# Patient Record
Sex: FEMALE | Race: WHITE | NOT HISPANIC OR LATINO | Employment: OTHER | URBAN - METROPOLITAN AREA
[De-identification: names, ages, dates, MRNs, and addresses within clinical notes are randomized per-mention and may not be internally consistent; named-entity substitution may affect disease eponyms.]

---

## 2017-02-08 ENCOUNTER — RECORDS - HEALTHEAST (OUTPATIENT)
Dept: LAB | Facility: CLINIC | Age: 62
End: 2017-02-08

## 2017-02-08 LAB
CHOLEST SERPL-MCNC: 259 MG/DL
FASTING STATUS PATIENT QL REPORTED: ABNORMAL
HDLC SERPL-MCNC: 55 MG/DL
LDLC SERPL CALC-MCNC: 179 MG/DL
TRIGL SERPL-MCNC: 126 MG/DL

## 2019-03-26 ENCOUNTER — RECORDS - HEALTHEAST (OUTPATIENT)
Dept: LAB | Facility: CLINIC | Age: 64
End: 2019-03-26

## 2019-03-26 LAB
CHOLEST SERPL-MCNC: 315 MG/DL
FASTING STATUS PATIENT QL REPORTED: ABNORMAL
HDLC SERPL-MCNC: 56 MG/DL
LDLC SERPL CALC-MCNC: 231 MG/DL
TRIGL SERPL-MCNC: 140 MG/DL

## 2019-03-27 LAB — 25(OH)D3 SERPL-MCNC: 45.8 NG/ML (ref 30–80)

## 2019-04-25 ENCOUNTER — RECORDS - HEALTHEAST (OUTPATIENT)
Dept: LAB | Facility: CLINIC | Age: 64
End: 2019-04-25

## 2019-04-25 LAB — AST SERPL W P-5'-P-CCNC: 30 U/L (ref 0–40)

## 2019-07-24 ENCOUNTER — RECORDS - HEALTHEAST (OUTPATIENT)
Dept: LAB | Facility: CLINIC | Age: 64
End: 2019-07-24

## 2019-07-24 LAB
AST SERPL W P-5'-P-CCNC: 32 U/L (ref 0–40)
CHOLEST SERPL-MCNC: 293 MG/DL
FASTING STATUS PATIENT QL REPORTED: YES
HDLC SERPL-MCNC: 55 MG/DL
LDLC SERPL CALC-MCNC: 203 MG/DL
TRIGL SERPL-MCNC: 176 MG/DL

## 2019-11-11 ENCOUNTER — RECORDS - HEALTHEAST (OUTPATIENT)
Dept: LAB | Facility: CLINIC | Age: 64
End: 2019-11-11

## 2019-11-11 LAB
CHOLEST SERPL-MCNC: 156 MG/DL
FASTING STATUS PATIENT QL REPORTED: NORMAL
HDLC SERPL-MCNC: 58 MG/DL
LDLC SERPL CALC-MCNC: 74 MG/DL
TRIGL SERPL-MCNC: 122 MG/DL

## 2020-10-27 PROBLEM — M21.379 FOOT DROP: Status: ACTIVE | Noted: 2020-10-27

## 2020-10-27 PROBLEM — G11.2: Status: ACTIVE | Noted: 2020-10-27

## 2020-10-27 PROBLEM — G35 RELAPSING REMITTING MULTIPLE SCLEROSIS (H): Status: ACTIVE | Noted: 2020-10-27

## 2020-10-27 SDOH — HEALTH STABILITY: MENTAL HEALTH: HOW OFTEN DO YOU HAVE 6 OR MORE DRINKS ON ONE OCCASION?: NOT ASKED

## 2020-10-27 SDOH — HEALTH STABILITY: MENTAL HEALTH: HOW MANY STANDARD DRINKS CONTAINING ALCOHOL DO YOU HAVE ON A TYPICAL DAY?: NOT ASKED

## 2020-10-27 SDOH — HEALTH STABILITY: MENTAL HEALTH: HOW OFTEN DO YOU HAVE A DRINK CONTAINING ALCOHOL?: 2-3 TIMES A WEEK

## 2020-10-28 ENCOUNTER — VIRTUAL VISIT (OUTPATIENT)
Dept: NEUROLOGY | Facility: CLINIC | Age: 65
End: 2020-10-28
Payer: MEDICARE

## 2020-10-28 VITALS — BODY MASS INDEX: 27.42 KG/M2 | WEIGHT: 149 LBS | HEIGHT: 62 IN

## 2020-10-28 DIAGNOSIS — G35 RELAPSING REMITTING MULTIPLE SCLEROSIS (H): Primary | ICD-10-CM

## 2020-10-28 DIAGNOSIS — M21.371 RIGHT FOOT DROP: ICD-10-CM

## 2020-10-28 PROCEDURE — 99215 OFFICE O/P EST HI 40 MIN: CPT | Mod: 95 | Performed by: PSYCHIATRY & NEUROLOGY

## 2020-10-28 RX ORDER — BACLOFEN 20 MG/1
20 TABLET ORAL 2 TIMES DAILY
COMMUNITY
Start: 2020-05-06 | End: 2020-10-28

## 2020-10-28 RX ORDER — SERTRALINE HYDROCHLORIDE 100 MG/1
TABLET, FILM COATED ORAL
COMMUNITY
Start: 2019-06-24 | End: 2020-10-28

## 2020-10-28 RX ORDER — CHOLECALCIFEROL (VITAMIN D3) 50 MCG
1 TABLET ORAL DAILY
COMMUNITY
End: 2023-08-29

## 2020-10-28 RX ORDER — SERTRALINE HYDROCHLORIDE 100 MG/1
100 TABLET, FILM COATED ORAL DAILY
Qty: 90 TABLET | Refills: 3 | Status: SHIPPED | OUTPATIENT
Start: 2020-10-28 | End: 2021-05-18

## 2020-10-28 RX ORDER — BACLOFEN 20 MG/1
20 TABLET ORAL 2 TIMES DAILY
Qty: 180 TABLET | Refills: 3 | Status: SHIPPED | OUTPATIENT
Start: 2020-10-28 | End: 2021-05-18

## 2020-10-28 RX ORDER — ALENDRONATE SODIUM 70 MG/1
TABLET ORAL
COMMUNITY
End: 2023-08-29

## 2020-10-28 SDOH — HEALTH STABILITY: MENTAL HEALTH: HOW OFTEN DO YOU HAVE A DRINK CONTAINING ALCOHOL?: NOT ASKED

## 2020-10-28 ASSESSMENT — MIFFLIN-ST. JEOR: SCORE: 1161.17

## 2020-10-28 NOTE — LETTER
"    10/28/2020         RE: Ruthie Aaron  [address] [place] [address]        Dear Colleague,    Thank you for referring your patient, Ruthie Aaron, to the [facility] [place]. Please see a copy of my visit note below.    Video follow-up visit requested by patient  Video follow-up visit due to the COVID-19 pandemic  Text number [phone]  Patient identified    .Patient is being evaluated via a billable video visit. The patient has been notified of following:     \"This video visit will be conducted via a call between you and your physician/provider. We have found that certain health care needs can be provided without the need for an in-person physical exam. This service lets us provide the care you need with a video conversation. If a prescription is necessary we can send it directly to your pharmacy. If lab work is needed we can place an order for that and you can then stop by our lab to have the test done at a later time.    If during the course of the call the physician/provider feels a video visit is not appropriate, you will not be charged for this service.    Physician has received verbal consent for a Video Visit from the patient? YES    Patient would like the video invitation sent by: Text number [phone]    Video Visit Details    Type of service: Video Visit    Video Start Time: 11:30 AM    Video End Time (time video stopped): 12:20 PM    Originating Location (pt. Location): Patient's Home    Distant Location (provider location): [facility]     Mode of Communication: Video Conference via [employer]/ [employer]            Chief Complaint   Difficulty with gait chronic from MS  Spasticity of lower extremities  Right foot drop better after some therapy  Yearly follow-up for MS not on any immunosuppressive medication      History of Present Illness :      65-year-old with history of multiple sclerosis being seen on October 28, 2020 yearly " follow-up    Patient has lapsing remitting MS    Patient has bilateral lower extremity spasticity    Patient is not on immune modulating medications we discussed at length today at side effects with multiple medicines as listed below    Patient needs to have a note for jury duty  Has fatigue  Has significant spastic gait  Feels that it would be overwhelming for her to participate in concentrate injury duty I will write her a note for that      Since last seen in November 2019  No hospitalizations  No surgeries  No major illnesses  No falls or injuries    Right foot drop seems to be stable better after some therapy last year    Does not seem to take the baclofen regularly rediscussed dosing regime that might help    Baclofen 20 mg tablet  Half a tablet the morning, half a tablet in the afternoon, whole tablet at night (total of 2 tablets/day divided)        She has chronic spasticity and a very poor gait should be using a walker all the time likes to use a single prong cane instead  She does have a brace on her right leg because she hyperextends the knee  She has severe spasticity bilaterally that throws off her gait in a lurching manner    It is worse when she is fatigued    She fell and with increased pain spasticity got worse  She was on Zanaflex we increased it to a half in the morning half in the afternoon and a whole at night but she is too tired and fatigue feels overmedicated she takes anymore she is actually too medicated and to loosen rubbery     switched her to baclofen last year still uses it only intermittently as above    Diagnoses of:    1. Relapsing-remitting multiple sclerosis.  2. Stopped her Tecfidera, her choice, greater than a year ago.  3. Has spastic weakness, more so on the right side than the left with spastic gait, uses a single prong cane.      She is on Fosamax and some vitamin D through her primary.        Past medical history:    1. Multiple sclerosis relapsing-remitting, diagnosed in  2000, positive spinal fluid, IgG synthesis rate of 16.1, positive oligoclonal bands.  2. MRI scan multiple T2 signal changes.  3. Symptoms as far back as 1999.  4. Totally disabled from competitive gainful employment due to MS due to spastic gait.  5. Does have cognitive difficulties with poor memory, significant fatigue, difficulty with recall, her memory is slowly getting worse.  6. Failed Avonex, switched to Copaxone.  7. 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  8. Took Tecfidera for maybe two months, quit because of diarrhea and flushing.  9. Stopped all immunomodulating medications over a year ago.  10. Previously treated with Ampyra and stopped that also.  11. Has a right footdrop, wears an AFO-type splint or brace to help pull up the foot.  12. Was seen at the [facility] and had a 7 mm aneurysm. They recommended leaving this alone.  13. Last MRI scan on November 11, 2015 showing patchy T2 signal changes and FLAIR changes, no enhancing lesions. That was compared to [facility] MRI on April 07, 2011.      Review of systems pertinent positives and negatives  No headache no chest pain no shortness of breath no nausea vomiting no diarrhea no fever chills no sore throat no cough    No diplopia dysarthria dysphagia    Chronic spasticity of the lower extremities about the same bilaterally    No new bowel or bladder difficulties  No new focal weakness  Arms work fairly well    Memory and recall are okay        Exam  Alert oriented x3  Moving air well  HEENT normal  Abdomen soft  No edema the feet    Neurologic exam  Alert oriented x3  Normal prosody speech  Normal naming  Normal repetition  Normal comprehension  No aphasia  No neglect  Memory recall mild difficulties  Gets a little perplexed sometimes if you jump from topic to topic too fast  We discussing medication dosing though it seems like you have to repeat things a lot so that she understands the  directions.    Cranial nerves II through XII  A little bit of cerebellar scanning speech  Pupils are symmetrical  No ophthalmoplegia  No visual field cut  Tongue twisters though are understandable      Upper extremities  No drift  No tremor  Seems to have a little bit of incoordination with finger-nose left greater than right  Rapid alternating movements may be asymmetric left worse than the right      Lower extremities  Increased tone  On video tough to say but maybe a little bit more on the right than the left  Not wearing her brace on the right leg today    Gait  Gets up from the chair pushing off with her arms a little bit  As stiffness in both legs  Walks around the kitchen but she touches the center island  Has kind of a lurching type gait  In an open space definitely would need an assistive device we discussed with her at length  Talk about gait safety  Inside the home she likes to touch things which keeps her safe      Both lower extremities have significant spasticity   Assessment/Plan Ataxia (G11.2)       Assessment/Plan     Ataxia (G11.2)   Talked about gait safety she likes the cane better than the walker but she does have a walker at home       Foot drop, right (M21.371)   She has a  knee brace to wear on the right leg to help hold it from hyperextending         Multiple Sclerosis, Relapsing-Remitting (G35)   Patient is off all immune modulating medications which was her choice    Zoloft 100 mg once at nighttime for mood  Baclofen 20 mg tablet, half a tab in the morning, half a tablet at noon, whole tablet at night new dosing regime  Takes vitamin D regularly  Talked about gait safety has a 4 wheeled walker and cane    I am concerned that she has more of secondary progressive type MS problems    I will dictate a note for her to be excused from jury duty       we will have her follow-up in late May early June 2020  Was supposed to follow-up earlier last year after adjustment in medications    Today's  visit was 50 minutes with greater than 50% of that time face-to-face discussion about multiple issues with spasticity gait problems and concerns above            Again, thank you for allowing me to participate in the care of your patient.        Sincerely,        Ari Bello MD

## 2020-10-28 NOTE — PROGRESS NOTES
"Video follow-up visit requested by patient  Video follow-up visit due to the COVID-19 pandemic  Text number [phone]  Patient identified    .Patient is being evaluated via a billable video visit. The patient has been notified of following:     \"This video visit will be conducted via a call between you and your physician/provider. We have found that certain health care needs can be provided without the need for an in-person physical exam. This service lets us provide the care you need with a video conversation. If a prescription is necessary we can send it directly to your pharmacy. If lab work is needed we can place an order for that and you can then stop by our lab to have the test done at a later time.    If during the course of the call the physician/provider feels a video visit is not appropriate, you will not be charged for this service.    Physician has received verbal consent for a Video Visit from the patient? YES    Patient would like the video invitation sent by: Text number [phone]    Video Visit Details    Type of service: Video Visit    Video Start Time: 11:30 AM    Video End Time (time video stopped): 12:20 PM    Originating Location (pt. Location): Patient's Home    Distant Location (provider location): [facility] [place]     Mode of Communication: Video Conference via [employer]/ [employer]            Chief Complaint   Difficulty with gait chronic from MS  Spasticity of lower extremities  Right foot drop better after some therapy  Yearly follow-up for MS not on any immunosuppressive medication      History of Present Illness :      65-year-old with history of multiple sclerosis being seen on October 28, 2020 yearly follow-up    Patient has lapsing remitting MS    Patient has bilateral lower extremity spasticity    Patient is not on immune modulating medications we discussed at length today at side effects with multiple medicines as listed below    Patient needs to have a " note for jurhayley duty  Has fatigue  Has significant spastic gait  Feels that it would be overwhelming for her to participate in concentrate injury duty I will write her a note for that      Since last seen in November 2019  No hospitalizations  No surgeries  No major illnesses  No falls or injuries    Right foot drop seems to be stable better after some therapy last year    Does not seem to take the baclofen regularly rediscussed dosing regime that might help    Baclofen 20 mg tablet  Half a tablet the morning, half a tablet in the afternoon, whole tablet at night (total of 2 tablets/day divided)        She has chronic spasticity and a very poor gait should be using a walker all the time likes to use a single prong cane instead  She does have a brace on her right leg because she hyperextends the knee  She has severe spasticity bilaterally that throws off her gait in a lurching manner    It is worse when she is fatigued    She fell and with increased pain spasticity got worse  She was on Zanaflex we increased it to a half in the morning half in the afternoon and a whole at night but she is too tired and fatigue feels overmedicated she takes anymore she is actually too medicated and to loosen rubbery     switched her to baclofen last year still uses it only intermittently as above    Diagnoses of:    1. Relapsing-remitting multiple sclerosis.  2. Stopped her Tecfidera, her choice, greater than a year ago.  3. Has spastic weakness, more so on the right side than the left with spastic gait, uses a single prong cane.      She is on Fosamax and some vitamin D through her primary.        Past medical history:    1. Multiple sclerosis relapsing-remitting, diagnosed in 2000, positive spinal fluid, IgG synthesis rate of 16.1, positive oligoclonal bands.  2. MRI scan multiple T2 signal changes.  3. Symptoms as far back as 1999.  4. Totally disabled from competitive gainful employment due to MS due to spastic gait.  5. Does have  cognitive difficulties with poor memory, significant fatigue, difficulty with recall, her memory is slowly getting worse.  6. Failed Avonex, switched to Copaxone.  7. 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  8. Took Tecfidera for maybe two months, quit because of diarrhea and flushing.  9. Stopped all immunomodulating medications over a year ago.  10. Previously treated with Ampyra and stopped that also.  11. Has a right footdrop, wears an AFO-type splint or brace to help pull up the foot.  12. Was seen at the [facility] and had a 7 mm aneurysm. They recommended leaving this alone.  13. Last MRI scan on November 11, 2015 showing patchy T2 signal changes and FLAIR changes, no enhancing lesions. That was compared to [facility] MRI on April 07, 2011.      Review of systems pertinent positives and negatives  No headache no chest pain no shortness of breath no nausea vomiting no diarrhea no fever chills no sore throat no cough    No diplopia dysarthria dysphagia    Chronic spasticity of the lower extremities about the same bilaterally    No new bowel or bladder difficulties  No new focal weakness  Arms work fairly well    Memory and recall are okay        Exam  Alert oriented x3  Moving air well  HEENT normal  Abdomen soft  No edema the feet    Neurologic exam  Alert oriented x3  Normal prosody speech  Normal naming  Normal repetition  Normal comprehension  No aphasia  No neglect  Memory recall mild difficulties  Gets a little perplexed sometimes if you jump from topic to topic too fast  We discussing medication dosing though it seems like you have to repeat things a lot so that she understands the directions.    Cranial nerves II through XII  A little bit of cerebellar scanning speech  Pupils are symmetrical  No ophthalmoplegia  No visual field cut  Tongue twisters though are understandable      Upper extremities  No drift  No tremor  Seems to have a little bit of  incoordination with finger-nose left greater than right  Rapid alternating movements may be asymmetric left worse than the right      Lower extremities  Increased tone  On video tough to say but maybe a little bit more on the right than the left  Not wearing her brace on the right leg today    Gait  Gets up from the chair pushing off with her arms a little bit  As stiffness in both legs  Walks around the kitchen but she touches the center island  Has kind of a lurching type gait  In an open space definitely would need an assistive device we discussed with her at length  Talk about gait safety  Inside the home she likes to touch things which keeps her safe      Both lower extremities have significant spasticity   Assessment/Plan Ataxia (G11.2)       Assessment/Plan     Ataxia (G11.2)   Talked about gait safety she likes the cane better than the walker but she does have a walker at home       Foot drop, right (M21.371)   She has a  knee brace to wear on the right leg to help hold it from hyperextending         Multiple Sclerosis, Relapsing-Remitting (G35)   Patient is off all immune modulating medications which was her choice    Zoloft 100 mg once at nighttime for mood  Baclofen 20 mg tablet, half a tab in the morning, half a tablet at noon, whole tablet at night new dosing regime  Takes vitamin D regularly  Talked about gait safety has a 4 wheeled walker and cane    I am concerned that she has more of secondary progressive type MS problems    I will dictate a note for her to be excused from jury duty       we will have her follow-up in late May early June 2020  Was supposed to follow-up earlier last year after adjustment in medications    Today's visit was 50 minutes with greater than 50% of that time face-to-face discussion about multiple issues with spasticity gait problems and concerns above

## 2020-10-28 NOTE — NURSING NOTE
Chief Complaint   Patient presents with     Follow Up     Multiple sclerosis. Pt states she is doing well. Getting a lot of rest. Needs letter excusing her from jury duty.     Video Visit     [facility] [phone]     Rosie Hedrick LPN on 10/28/2020 at 11:27 AM

## 2020-10-28 NOTE — LETTER
October 28, 2020      Ruthie Aaron  [address] [address]        Dear ,    To whom it may concern:    The above person is followed neurologically for her multiple sclerosis.  She has had multiple sclerosis for at least 20 years.  She has difficulty with spastic gait  Cerebellar scanning speech  Fatigue  Difficulty with concentration when she is tired      I feel due to her neurologic condition above she should be excused from serving on jury duty, due to her medical issues, multiple sclerosis chronic in nature.      If you have any questions or concerns, please call the clinic at the number listed above.       Sincerely,        Ari Bello MD

## 2020-12-02 ENCOUNTER — RECORDS - HEALTHEAST (OUTPATIENT)
Dept: LAB | Facility: CLINIC | Age: 65
End: 2020-12-02

## 2020-12-02 LAB
CHOLEST SERPL-MCNC: 305 MG/DL
FASTING STATUS PATIENT QL REPORTED: ABNORMAL
HDLC SERPL-MCNC: 59 MG/DL
LDLC SERPL CALC-MCNC: 208 MG/DL
TRIGL SERPL-MCNC: 188 MG/DL

## 2020-12-03 LAB — 25(OH)D3 SERPL-MCNC: 29.5 NG/ML (ref 30–80)

## 2021-05-18 ENCOUNTER — OFFICE VISIT (OUTPATIENT)
Dept: NEUROLOGY | Facility: CLINIC | Age: 66
End: 2021-05-18
Payer: MEDICARE

## 2021-05-18 VITALS
WEIGHT: 159 LBS | BODY MASS INDEX: 30.02 KG/M2 | HEART RATE: 72 BPM | HEIGHT: 61 IN | DIASTOLIC BLOOD PRESSURE: 87 MMHG | SYSTOLIC BLOOD PRESSURE: 145 MMHG

## 2021-05-18 DIAGNOSIS — G35 RELAPSING REMITTING MULTIPLE SCLEROSIS (H): Primary | ICD-10-CM

## 2021-05-18 PROCEDURE — 99214 OFFICE O/P EST MOD 30 MIN: CPT | Performed by: PSYCHIATRY & NEUROLOGY

## 2021-05-18 RX ORDER — SERTRALINE HYDROCHLORIDE 100 MG/1
100 TABLET, FILM COATED ORAL DAILY
Qty: 90 TABLET | Refills: 3 | Status: SHIPPED | OUTPATIENT
Start: 2021-05-18 | End: 2022-04-14

## 2021-05-18 RX ORDER — ROSUVASTATIN CALCIUM 20 MG/1
TABLET, COATED ORAL
COMMUNITY
Start: 2021-02-27 | End: 2022-08-18

## 2021-05-18 RX ORDER — BACLOFEN 20 MG/1
20 TABLET ORAL 2 TIMES DAILY
Qty: 180 TABLET | Refills: 3 | Status: SHIPPED | OUTPATIENT
Start: 2021-05-18 | End: 2022-07-05

## 2021-05-18 ASSESSMENT — MIFFLIN-ST. JEOR: SCORE: 1193.6

## 2021-05-18 NOTE — LETTER
5/18/2021         RE: Ruthie Aaron  [address] [address]        Dear Colleague,    Thank you for referring your patient, Ruthie Aaron, to the [facility] [place]. Please see a copy of my visit note below.    In person neurologic evaluation      Chief Complaint   Difficulty with gait chronic from MS  Spasticity of lower extremities  Right foot drop better after some therapy    Yearly follow-up for MS not on any immunosuppressive medication      History of Present Illness :  10/28/2020, video visit  5/18/2021, in person visit    66-year-old with multiple sclerosis relapsing remitting  Has been off immune modulating medications in the past we discussed that she did not want to try any others  She has a relative with MS that might be getting worse and now is starting to contemplate possibly going on Ocrevis    Discussed with patient immune modulation we would recommend an MRI follow-up to see if there is any active lesions  Consider following at the [facility] MS clinic    Currently comes with her single prong cane does have for a walker with hand brakes  Extremely spastic lower extremities high risk for falls discussed gait safety at length    Since last seen  No hospitalizations  No surgeries  No major illnesses  No Covid illness  To get the Covid vaccine    Does get drowsy with the half a tablet baclofen at noon does not eat breakfast gets up at 9 takes the 2 have to close together regarding spread them out further and try to get her to take some food with it    Baclofen dosing will be half a tablet in the morning half a tab in the afternoon whole tablet at nighttime total of 2 tablets/day    In the past  Patient needs to have a note for jury duty  Has fatigue  Has significant spastic gait  Feels that it would be overwhelming for her to participate in concentrate injury duty I will write her a note for that        She has chronic spasticity and a very poor gait should  be using a walker all the time likes to use a single prong cane instead  She does have a brace on her right leg because she hyperextends the knee  She has severe spasticity bilaterally that throws off her gait in a lurching manner    It is worse when she is fatigued      Diagnoses of:    1. Relapsing-remitting multiple sclerosis.  2. Stopped her Tecfidera, her choice, greater than a year ago.  3. Has spastic weakness, more so on the right side than the left with spastic gait, uses a single prong cane.      She is on Fosamax and some vitamin D through her primary.        Past medical history:    1. Multiple sclerosis relapsing-remitting, diagnosed in 2000, positive spinal fluid, IgG synthesis rate of 16.1, positive oligoclonal bands.  2. MRI scan multiple T2 signal changes.  3. Symptoms as far back as 1999.  4. Totally disabled from competitive gainful employment due to MS due to spastic gait.  5. Does have cognitive difficulties with poor memory, significant fatigue, difficulty with recall, her memory is slowly getting worse.  6. Failed Avonex, switched to Copaxone.  7. 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  8. Took Tecfidera for maybe two months, quit because of diarrhea and flushing.  9. Stopped all immunomodulating medications over a year ago.  10. Previously treated with Ampyra and stopped that also.  11. Has a right footdrop, wears an AFO-type splint or brace to help pull up the foot.  12. Was seen at the [facility] and had a 7 mm aneurysm. They recommended leaving this alone.  13. Last MRI scan on November 11, 2015 showing patchy T2 signal changes and FLAIR changes, no enhancing lesions. That was compared to [facility] MRI on April 07, 2011.      Review of systems pertinent positives and negatives  No headache no chest pain no shortness of breath no nausea vomiting no fever chills no sore throat    No diplopia dysarthria dysphagia    Chronic spasticity  severe in the lower extremities bilaterally    No new bowel or bladder difficulty    Cognition stable      Otherwise review systems negative        Exam  Blood pressure 145/87  Alert oriented x3  Lungs clear  HEENT normal  Heart rate regular  Abdomen soft  No edema the feet    Neurologic exam  Alert oriented x3  Normal prosody speech  Normal naming  Normal repetition  Normal comprehension  No aphasia  No neglect  Memory recall mild difficulties  Gets a little perplexed sometimes if you jump from topic to topic too fast  We discussing medication dosing though it seems like you have to repeat things a lot so that she understands the directions.    Cranial nerves II through XII  A little bit of cerebellar scanning speech  Pupils are symmetrical  No ophthalmoplegia  No visual field cut  Tongue twisters though are understandable      Upper extremities  No drift  No tremor  Seems to have a little bit of incoordination with finger-nose left greater than right  Rapid alternating movements may be asymmetric left worse than the right      Lower extremities  Increased tone      Gait  Spastic legs has to position them before getting up  When she walks with a single prong cane she is really not that safe would be better with a 4 wheeled walker  Both lower extremities have significant spasticity            Assessment/Plan     1.  Ataxia (G11.2)   Talked about gait safety she likes the cane better than the walker but she does have a walker at home       2.  Foot drop, right (M21.371)   She has a  knee brace to wear on the right leg to help hold it from hyperextending         3.  Multiple Sclerosis, Relapsing-Remitting (G35)   Patient is off all immune modulating medications which was her choice      4.  Current medications  Zoloft 100 mg once at nighttime for mood  Baclofen 20 mg tablet, half a tab in the morning, half a tablet at noon, whole tablet at night new dosing regime  Takes vitamin D regularly  Talked about gait safety  has a 4 wheeled walker and cane    I am concerned that she has more of secondary progressive type MS problems      Set up physical therapy  Follow-up in 3 months  1 year patient is more in the secondary progressive stage of the disease    Discussed with patient and  face-to-face      Total care time today 30 minutes        Again, thank you for allowing me to participate in the care of your patient.        Sincerely,        Ari Bello MD

## 2021-05-18 NOTE — NURSING NOTE
Chief Complaint   Patient presents with     Multiple Sclerosis     Rosie Hedrick LPN on 5/18/2021 at 1:00 PM

## 2021-05-18 NOTE — PROGRESS NOTES
In person neurologic evaluation      Chief Complaint   Difficulty with gait chronic from MS  Spasticity of lower extremities  Right foot drop better after some therapy    Yearly follow-up for MS not on any immunosuppressive medication      History of Present Illness :  10/28/2020, video visit  5/18/2021, in person visit    66-year-old with multiple sclerosis relapsing remitting  Has been off immune modulating medications in the past we discussed that she did not want to try any others  She has a relative with MS that might be getting worse and now is starting to contemplate possibly going on Ocrevis    Discussed with patient immune modulation we would recommend an MRI follow-up to see if there is any active lesions  Consider following at the [facility] MS clinic    Currently comes with her single prong cane does have for a walker with hand brakes  Extremely spastic lower extremities high risk for falls discussed gait safety at length    Since last seen  No hospitalizations  No surgeries  No major illnesses  No Covid illness  To get the Covid vaccine    Does get drowsy with the half a tablet baclofen at noon does not eat breakfast gets up at 9 takes the 2 have to close together regarding spread them out further and try to get her to take some food with it    Baclofen dosing will be half a tablet in the morning half a tab in the afternoon whole tablet at nighttime total of 2 tablets/day    In the past  Patient needs to have a note for jury duty  Has fatigue  Has significant spastic gait  Feels that it would be overwhelming for her to participate in concentrate injury duty I will write her a note for that        She has chronic spasticity and a very poor gait should be using a walker all the time likes to use a single prong cane instead  She does have a brace on her right leg because she hyperextends the knee  She has severe spasticity bilaterally that throws off her gait in a lurching manner    It is worse when she is  fatigued      Diagnoses of:    1. Relapsing-remitting multiple sclerosis.  2. Stopped her Tecfidera, her choice, greater than a year ago.  3. Has spastic weakness, more so on the right side than the left with spastic gait, uses a single prong cane.      She is on Fosamax and some vitamin D through her primary.        Past medical history:    1. Multiple sclerosis relapsing-remitting, diagnosed in 2000, positive spinal fluid, IgG synthesis rate of 16.1, positive oligoclonal bands.  2. MRI scan multiple T2 signal changes.  3. Symptoms as far back as 1999.  4. Totally disabled from competitive gainful employment due to MS due to spastic gait.  5. Does have cognitive difficulties with poor memory, significant fatigue, difficulty with recall, her memory is slowly getting worse.  6. Failed Avonex, switched to Copaxone.  7. 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  8. Took Tecfidera for maybe two months, quit because of diarrhea and flushing.  9. Stopped all immunomodulating medications over a year ago.  10. Previously treated with Ampyra and stopped that also.  11. Has a right footdrop, wears an AFO-type splint or brace to help pull up the foot.  12. Was seen at the [facility] and had a 7 mm aneurysm. They recommended leaving this alone.  13. Last MRI scan on November 11, 2015 showing patchy T2 signal changes and FLAIR changes, no enhancing lesions. That was compared to [facility] MRI on April 07, 2011.      Review of systems pertinent positives and negatives  No headache no chest pain no shortness of breath no nausea vomiting no fever chills no sore throat    No diplopia dysarthria dysphagia    Chronic spasticity severe in the lower extremities bilaterally    No new bowel or bladder difficulty    Cognition stable      Otherwise review systems negative        Exam  Blood pressure 145/87  Alert oriented x3  Lungs clear  HEENT normal  Heart rate regular  Abdomen  soft  No edema the feet    Neurologic exam  Alert oriented x3  Normal prosody speech  Normal naming  Normal repetition  Normal comprehension  No aphasia  No neglect  Memory recall mild difficulties  Gets a little perplexed sometimes if you jump from topic to topic too fast  We discussing medication dosing though it seems like you have to repeat things a lot so that she understands the directions.    Cranial nerves II through XII  A little bit of cerebellar scanning speech  Pupils are symmetrical  No ophthalmoplegia  No visual field cut  Tongue twisters though are understandable      Upper extremities  No drift  No tremor  Seems to have a little bit of incoordination with finger-nose left greater than right  Rapid alternating movements may be asymmetric left worse than the right      Lower extremities  Increased tone      Gait  Spastic legs has to position them before getting up  When she walks with a single prong cane she is really not that safe would be better with a 4 wheeled walker  Both lower extremities have significant spasticity            Assessment/Plan     1.  Ataxia (G11.2)   Talked about gait safety she likes the cane better than the walker but she does have a walker at home       2.  Foot drop, right (M21.371)   She has a  knee brace to wear on the right leg to help hold it from hyperextending         3.  Multiple Sclerosis, Relapsing-Remitting (G35)   Patient is off all immune modulating medications which was her choice      4.  Current medications  Zoloft 100 mg once at nighttime for mood  Baclofen 20 mg tablet, half a tab in the morning, half a tablet at noon, whole tablet at night new dosing regime  Takes vitamin D regularly  Talked about gait safety has a 4 wheeled walker and cane    I am concerned that she has more of secondary progressive type MS problems      Set up physical therapy  Follow-up in 3 months  1 year patient is more in the secondary progressive stage of the disease    Discussed with  patient and  face-to-face      Total care time today 30 minutes

## 2021-06-09 ENCOUNTER — RECORDS - HEALTHEAST (OUTPATIENT)
Dept: LAB | Facility: CLINIC | Age: 66
End: 2021-06-09

## 2021-06-09 LAB
CHOLEST SERPL-MCNC: 157 MG/DL
FASTING STATUS PATIENT QL REPORTED: NORMAL
HDLC SERPL-MCNC: 56 MG/DL
LDLC SERPL CALC-MCNC: 77 MG/DL
TRIGL SERPL-MCNC: 121 MG/DL

## 2021-06-10 LAB — 25(OH)D3 SERPL-MCNC: 41.2 NG/ML (ref 30–80)

## 2021-06-29 ENCOUNTER — MEDICAL CORRESPONDENCE (OUTPATIENT)
Dept: HEALTH INFORMATION MANAGEMENT | Facility: CLINIC | Age: 66
End: 2021-06-29

## 2021-07-16 ENCOUNTER — TRANSFERRED RECORDS (OUTPATIENT)
Dept: HEALTH INFORMATION MANAGEMENT | Facility: CLINIC | Age: 66
End: 2021-07-16

## 2021-09-17 ENCOUNTER — TRANSFERRED RECORDS (OUTPATIENT)
Dept: HEALTH INFORMATION MANAGEMENT | Facility: CLINIC | Age: 66
End: 2021-09-17

## 2022-04-13 DIAGNOSIS — G35 RELAPSING REMITTING MULTIPLE SCLEROSIS (H): ICD-10-CM

## 2022-04-13 NOTE — LETTER
4/13/2022        RE: Ruthie NOHEMI Aaron  [address] [address]                Dear Ruthie,        We recently provided you with medication refills.  Many medications require routine follow-up with your doctor.    Your prescription(s) have been refilled for 30 days so you may have time for the above noted follow-up. Please call to schedule soon so we can assure you have an appointment before your next refills are needed. If you have already made a follow up appointment, please disregard this letter.           Sincerely,        [facility] [place]     (Formerly known as [facility])

## 2022-04-14 NOTE — TELEPHONE ENCOUNTER
Refill request for sertraline.  Last refilled on 5/18/21.  Due for follow up. Letter mailed to pt to remind to schedule.  30 day supply of Medication T'd for review and signature      Rosie Hedrick LPN on 4/14/2022 at 8:56 AM

## 2022-04-15 RX ORDER — SERTRALINE HYDROCHLORIDE 100 MG/1
100 TABLET, FILM COATED ORAL DAILY
Qty: 30 TABLET | Refills: 0 | Status: SHIPPED | OUTPATIENT
Start: 2022-04-15 | End: 2022-05-27

## 2022-05-26 DIAGNOSIS — G35 RELAPSING REMITTING MULTIPLE SCLEROSIS (H): ICD-10-CM

## 2022-05-27 ENCOUNTER — MYC MEDICAL ADVICE (OUTPATIENT)
Dept: NEUROLOGY | Facility: CLINIC | Age: 67
End: 2022-05-27
Payer: MEDICARE

## 2022-05-27 DIAGNOSIS — G35 RELAPSING REMITTING MULTIPLE SCLEROSIS (H): Primary | ICD-10-CM

## 2022-05-27 RX ORDER — SERTRALINE HYDROCHLORIDE 100 MG/1
TABLET, FILM COATED ORAL
Qty: 14 TABLET | Refills: 0 | Status: SHIPPED | OUTPATIENT
Start: 2022-05-27 | End: 2022-08-16

## 2022-05-27 NOTE — TELEPHONE ENCOUNTER
Refill request for Zoloft. Pt last seen 5/18/21. Letter has been mailed regarding need for follow up. Will send in 14 day supply with zero refills.     Vane Garner RN on 5/27/2022 at 8:26 AM

## 2022-06-01 NOTE — TELEPHONE ENCOUNTER
Patient with multiple sclerosis possibly in the secondary progressive type phase.  Patient last seen 5/18/2021, was going to follow-up in August 2021.    Has had difficulty with immune modulating medications in the past    Has questions whether she would be a candidate for one of the newer medications that have come out.    I discussed with her in the past that we probably would need follow-up MRI scans to see if there is active lesions and then decide the risk versus benefits.    1.  Recommend setting patient up to see MS expert at the [facility] to see if she would qualify for one of the newer agents in regards to her probable secondary progressive MS.    2.  Okay to refill her medications try to get her a follow-up visit in the next 3 months.    Please let her know about the following.    Ari Bello MD on 6/1/2022 at 3:29 PM

## 2022-06-18 ENCOUNTER — HEALTH MAINTENANCE LETTER (OUTPATIENT)
Age: 67
End: 2022-06-18

## 2022-06-21 NOTE — TELEPHONE ENCOUNTER
RECORDS RECEIVED FROM: internal   REASON FOR VISIT: MS   Date of Appt: 8/12/22   NOTES (FOR ALL VISITS) STATUS DETAILS   OFFICE NOTE from referring provider Internal Dr Ari Bello @ [facility];  5/27/22 encounter  8/18/21  5/18/21  10/28/20   MEDICATION LIST Internal    IMAGING  (FOR ALL VISITS)     MRI (HEAD, NECK, SPINE) Internal [facility]:  MRI Brain 11/12/15

## 2022-07-05 DIAGNOSIS — G35 RELAPSING REMITTING MULTIPLE SCLEROSIS (H): ICD-10-CM

## 2022-07-05 RX ORDER — BACLOFEN 20 MG/1
TABLET ORAL
Qty: 180 TABLET | Refills: 1 | Status: SHIPPED | OUTPATIENT
Start: 2022-07-05 | End: 2022-10-28

## 2022-07-05 NOTE — TELEPHONE ENCOUNTER
Refill request for Baclofen. Pt last seen 5/18/21 and has follow up scheduled for 12/20/22. Will send in refills.     Vane Garner RN on 7/5/2022 at 2:38 PM

## 2022-08-12 ENCOUNTER — PRE VISIT (OUTPATIENT)
Dept: NEUROLOGY | Facility: CLINIC | Age: 67
End: 2022-08-12
Payer: MEDICARE

## 2022-08-12 ENCOUNTER — OFFICE VISIT (OUTPATIENT)
Dept: NEUROLOGY | Facility: CLINIC | Age: 67
End: 2022-08-12
Attending: PSYCHIATRY & NEUROLOGY
Payer: MEDICARE

## 2022-08-12 ENCOUNTER — LAB (OUTPATIENT)
Dept: LAB | Facility: CLINIC | Age: 67
End: 2022-08-12
Payer: MEDICARE

## 2022-08-12 VITALS
OXYGEN SATURATION: 96 % | BODY MASS INDEX: 29.44 KG/M2 | SYSTOLIC BLOOD PRESSURE: 122 MMHG | DIASTOLIC BLOOD PRESSURE: 84 MMHG | HEART RATE: 72 BPM | WEIGHT: 155.8 LBS

## 2022-08-12 DIAGNOSIS — G35 MS (MULTIPLE SCLEROSIS) (H): Primary | ICD-10-CM

## 2022-08-12 DIAGNOSIS — E55.9 VITAMIN D DEFICIENCY: ICD-10-CM

## 2022-08-12 DIAGNOSIS — R53.82 CHRONIC FATIGUE: ICD-10-CM

## 2022-08-12 DIAGNOSIS — R25.2 SPASTICITY: ICD-10-CM

## 2022-08-12 DIAGNOSIS — G82.20 PARAPARESIS (H): ICD-10-CM

## 2022-08-12 DIAGNOSIS — G35 MS (MULTIPLE SCLEROSIS) (H): ICD-10-CM

## 2022-08-12 LAB
CREAT SERPL-MCNC: 0.73 MG/DL (ref 0.52–1.04)
DEPRECATED CALCIDIOL+CALCIFEROL SERPL-MC: 63 UG/L (ref 20–75)
GFR SERPL CREATININE-BSD FRML MDRD: 89 ML/MIN/1.73M2

## 2022-08-12 PROCEDURE — 99000 SPECIMEN HANDLING OFFICE-LAB: CPT | Performed by: PATHOLOGY

## 2022-08-12 PROCEDURE — 82565 ASSAY OF CREATININE: CPT | Performed by: PATHOLOGY

## 2022-08-12 PROCEDURE — 82306 VITAMIN D 25 HYDROXY: CPT | Performed by: PSYCHIATRY & NEUROLOGY

## 2022-08-12 PROCEDURE — 36415 COLL VENOUS BLD VENIPUNCTURE: CPT | Performed by: PATHOLOGY

## 2022-08-12 PROCEDURE — 99417 PROLNG OP E/M EACH 15 MIN: CPT | Performed by: PSYCHIATRY & NEUROLOGY

## 2022-08-12 PROCEDURE — 99215 OFFICE O/P EST HI 40 MIN: CPT | Performed by: PSYCHIATRY & NEUROLOGY

## 2022-08-12 RX ORDER — MODAFINIL 100 MG/1
100-200 TABLET ORAL DAILY PRN
Qty: 60 TABLET | Refills: 5 | Status: SHIPPED | OUTPATIENT
Start: 2022-08-12 | End: 2023-07-28

## 2022-08-12 RX ORDER — DALFAMPRIDINE 10 MG/1
10 TABLET, FILM COATED, EXTENDED RELEASE ORAL 2 TIMES DAILY
Qty: 60 TABLET | Refills: 5 | Status: SHIPPED | OUTPATIENT
Start: 2022-08-12 | End: 2023-02-06

## 2022-08-12 ASSESSMENT — PAIN SCALES - GENERAL: PAINLEVEL: NO PAIN (0)

## 2022-08-12 NOTE — PATIENT INSTRUCTIONS
You have had MS for a long time     You are in the progressive phase of the disease  This causes your walking to gradually get worse -- this is typically caused by the aging process, but inflammation can make this happen faster     You have been off MS treatment for several years  You have not had an MRI since you stopped treatment    I recommend that we update your MRI and compare to the study from [facility]   *obtain records from [facility]     Blood work today     Try ampyra for walking   Stop after one month if no improvement in walking     Work with physical therapy     Baclofen take 1/2 tablet in the morning, 1/2 tablet midday, and 1 tablet at bedtime     I have sent modafinil for you to try for energy (provigil)     Follow up to discuss results

## 2022-08-12 NOTE — LETTER
8/12/2022       RE: Ruthie Aaron  [address] [address]     Dear Colleague,    Thank you for referring your patient, Ruthie Aaron, to the [facility] MULTIPLE SCLEROSIS CLINIC [place] at [facility]. Please see a copy of my visit note below.    Date of Service: 8/12/2022    [facility]   MS Clinic Evaluation     History of Present Illness: 67 year old woman with HLD, osteoporosis, and multiple sclerosis who presents for evaluation of MS     Disease onset in mid 40s when she had an episode of lower extremity spasms and tripping over her right foot.  She was given steroids.  Symptoms did improve.     Over time she has experienced a gradual decline in gait.  Onset in her early 50s.  She started to experience heaviness of the right lower extremity moreso than the left lower extremity.  Gait instability managed by wall walking.  She started to use a cane. She estimates that 10 years ago she started to use a walker to go longer distances, but could still go 1 mile.  Currently she is able to go 1 block before she needs to take a break.     Upper extremities are not affected.      She does experience some urinary urgency.  No recent UTIs.     Bowel function is intact    Fatigue does affect her on a daily basis.  She is more likely to experience this if she gets overheated such as during activities like cooking.  She becomes fatigued to the point that she needs to take a nap by 3 PM.  She has tried Provigil in the past.  This was helpful at first.  She does typically sleep from 1 AM to 10 AM.    She has struggled with spasticity.  Baclofen caused somnolence.  Tizanidine also cause somnolence that would come on within 1/2-hour of taking the medication.    She is taking vitamin D3 2000 international units daily.    Disease onset: Age 45 when she experienced tripping over her right foot and spasms  Progression onset: Mid 50s, manifesting with a  right foot drop much more than the left    DMD history:   Avonex 5719-6431 flulike side effects, injection intolerance  Copaxone 4118-1139, discontinued due to lipoatrophy  Tecfidera 2017 for 2 months, discontinued due to flushing      No prior history of Ampyra trial    Allergies   Allergen Reactions     Sulfacetamide      Other reaction(s): hives       Current Outpatient Medications   Medication     alendronate (FOSAMAX) 70 MG tablet     baclofen (LIORESAL) 20 MG tablet     sertraline (ZOLOFT) 100 MG tablet     vitamin D3 (CHOLECALCIFEROL) 50 mcg (2000 units) tablet     rosuvastatin (CRESTOR) 20 MG tablet     No current facility-administered medications for this visit.        Past medical, surgical, social and family history was personally reviewed. Pertinent details noted above.     Physical Examination:   /84 (BP Location: Left arm, Patient Position: Sitting, Cuff Size: Adult Regular)   Pulse 72   Wt 70.7 kg (155 lb 12.8 oz)   SpO2 96%   BMI 29.44 kg/m      General: no acute distress  Cranial nerves:   VFFC  PERRL w/no RAPD  EOM full w/R GULSHAN and L CN VI   +L facial paresis  Hearing intact  No dysarthria   Motor:   Tone is spastic in the lower extremities  Bulk is normal     R L  Deltoid  5 5  Biceps  5 5  Triceps 5 5  Wrist ext 5 5  Finger ext 5 5-  Finger abd 5 5-    Hip flexion 3 3  Knee flexion 4+ 5  Knee ext 5 5  Ankle d/f 4- 4+    Reflexes: brisk in lower extremities, babinski absent bilaterally  Sensory: vibration is severely reduced in the L toe, mild L ankle, Mod R toe, mild R ankle, JPS mod reduced in the L toe, normal on the right    Romberg is not assessed due to instability   Coordination: no ataxia or dysmetria  Gait: spastic gait with frequent catching of R toe  25 foot walk 29 sec    Tests/Imaging:     D 41    MRI brain   2015 - mod sev lesion burden with predominantly pvl, but also few jcl and dwml, but no pf lesions        Assessment: 67 year old woman with secondary progressive  multiple sclerosis who has been off disease modifying therapy for 5 years and no follow up imaging after discontinuation.     I reviewed the pathophysiology of progressive disease and the role of inflammation vs aging. DMT can be helpful in slowing the rate of decline if there is still evidence of inflammatory active.  Follow up MRI advised.     Importance of physical activity emphasized. Advised she follow up with STEP therapy    Role of ampyra discussed. Advise retrial. No prior h/o seizure. Will check renal function.     Can try modafinil for MS related fatigue.     Plan:     - MRI   - obtain imaging from [facility]   - ampyra trial   - modafinil prn   - cr, vit d  - physical therapy   - follow up to discuss results    Note was completed with the assistance of Dragon Fluency software which can often result in accidental word substitutions.     A total of 75 minutes on the date of service were spent in the care of this patient.     Samantha Humphrey MD on 8/12/2022 at 11:01 AM

## 2022-08-12 NOTE — PROGRESS NOTES
Date of Service: 8/12/2022    [facility]   MS Clinic Evaluation     History of Present Illness: 67 year old woman with HLD, osteoporosis, and multiple sclerosis who presents for evaluation of MS     Disease onset in mid 40s when she had an episode of lower extremity spasms and tripping over her right foot.  She was given steroids.  Symptoms did improve.     Over time she has experienced a gradual decline in gait.  Onset in her early 50s.  She started to experience heaviness of the right lower extremity moreso than the left lower extremity.  Gait instability managed by wall walking.  She started to use a cane. She estimates that 10 years ago she started to use a walker to go longer distances, but could still go 1 mile.  Currently she is able to go 1 block before she needs to take a break.     Upper extremities are not affected.      She does experience some urinary urgency.  No recent UTIs.     Bowel function is intact    Fatigue does affect her on a daily basis.  She is more likely to experience this if she gets overheated such as during activities like cooking.  She becomes fatigued to the point that she needs to take a nap by 3 PM.  She has tried Provigil in the past.  This was helpful at first.  She does typically sleep from 1 AM to 10 AM.    She has struggled with spasticity.  Baclofen caused somnolence.  Tizanidine also cause somnolence that would come on within 1/2-hour of taking the medication.    She is taking vitamin D3 2000 international units daily.    Disease onset: Age 45 when she experienced tripping over her right foot and spasms  Progression onset: Mid 50s, manifesting with a right foot drop much more than the left    DMD history:   Avonex 6363-0874 flulike side effects, injection intolerance  Copaxone 7052-7637, discontinued due to lipoatrophy  Tecfidera 2017 for 2 months, discontinued due to flushing      No prior history of Ampyra trial    Allergies   Allergen Reactions     Sulfacetamide       Other reaction(s): hives       Current Outpatient Medications   Medication     alendronate (FOSAMAX) 70 MG tablet     baclofen (LIORESAL) 20 MG tablet     sertraline (ZOLOFT) 100 MG tablet     vitamin D3 (CHOLECALCIFEROL) 50 mcg (2000 units) tablet     rosuvastatin (CRESTOR) 20 MG tablet     No current facility-administered medications for this visit.        Past medical, surgical, social and family history was personally reviewed. Pertinent details noted above.     Physical Examination:   /84 (BP Location: Left arm, Patient Position: Sitting, Cuff Size: Adult Regular)   Pulse 72   Wt 70.7 kg (155 lb 12.8 oz)   SpO2 96%   BMI 29.44 kg/m      General: no acute distress  Cranial nerves:   VFFC  PERRL w/no RAPD  EOM full w/R GULSHAN and L CN VI   +L facial paresis  Hearing intact  No dysarthria   Motor:   Tone is spastic in the lower extremities  Bulk is normal     R L  Deltoid  5 5  Biceps  5 5  Triceps 5 5  Wrist ext 5 5  Finger ext 5 5-  Finger abd 5 5-    Hip flexion 3 3  Knee flexion 4+ 5  Knee ext 5 5  Ankle d/f 4- 4+    Reflexes: brisk in lower extremities, babinski absent bilaterally  Sensory: vibration is severely reduced in the L toe, mild L ankle, Mod R toe, mild R ankle, JPS mod reduced in the L toe, normal on the right    Romberg is not assessed due to instability   Coordination: no ataxia or dysmetria  Gait: spastic gait with frequent catching of R toe  25 foot walk 29 sec    Tests/Imaging:     D 41    MRI brain   2015 - mod sev lesion burden with predominantly pvl, but also few jcl and dwml, but no pf lesions        Assessment: 67 year old woman with secondary progressive multiple sclerosis who has been off disease modifying therapy for 5 years and no follow up imaging after discontinuation.     I reviewed the pathophysiology of progressive disease and the role of inflammation vs aging. DMT can be helpful in slowing the rate of decline if there is still evidence of inflammatory active.  Follow  up MRI advised.     Importance of physical activity emphasized. Advised she follow up with STEP therapy    Role of ampyra discussed. Advise retrial. No prior h/o seizure. Will check renal function.     Can try modafinil for MS related fatigue.     Plan:     - MRI   - obtain imaging from [facility]   - ampyra trial   - modafinil prn   - cr, vit d  - physical therapy   - follow up to discuss results    Note was completed with the assistance of Dragon Fluency software which can often result in accidental word substitutions.     A total of 75 minutes on the date of service were spent in the care of this patient.   Samantha Humphrey MD on 8/12/2022 at 11:01 AM

## 2022-08-13 DIAGNOSIS — G35 RELAPSING REMITTING MULTIPLE SCLEROSIS (H): ICD-10-CM

## 2022-08-16 RX ORDER — SERTRALINE HYDROCHLORIDE 100 MG/1
TABLET, FILM COATED ORAL
Qty: 90 TABLET | Refills: 1 | Status: SHIPPED | OUTPATIENT
Start: 2022-08-16 | End: 2022-12-28

## 2022-08-16 NOTE — TELEPHONE ENCOUNTER
Refill request for sertraline.  Pt has upcoming appt on 12/2022.  Medication T'd for review and signature    Rosie Hedrick LPN on 8/16/2022 at 4:18 PM

## 2022-09-07 ENCOUNTER — HOSPITAL ENCOUNTER (OUTPATIENT)
Dept: MRI IMAGING | Facility: HOSPITAL | Age: 67
Discharge: HOME | End: 2022-09-07
Attending: PSYCHIATRY & NEUROLOGY
Payer: MEDICARE

## 2022-09-07 DIAGNOSIS — G35 MS (MULTIPLE SCLEROSIS) (H): ICD-10-CM

## 2022-09-07 PROCEDURE — 255N000002 HC RX 255 OP 636: Performed by: PSYCHIATRY & NEUROLOGY

## 2022-09-07 PROCEDURE — G1010 CDSM STANSON: HCPCS

## 2022-09-07 PROCEDURE — A9585 GADOBUTROL INJECTION: HCPCS | Performed by: PSYCHIATRY & NEUROLOGY

## 2022-09-07 RX ORDER — GADOBUTROL 604.72 MG/ML
7 INJECTION INTRAVENOUS ONCE
Status: COMPLETED | OUTPATIENT
Start: 2022-09-07 | End: 2022-09-07

## 2022-09-07 RX ADMIN — GADOBUTROL 7 ML: 604.72 INJECTION INTRAVENOUS at 13:31

## 2022-09-08 ENCOUNTER — TRANSFERRED RECORDS (OUTPATIENT)
Dept: HEALTH INFORMATION MANAGEMENT | Facility: CLINIC | Age: 67
End: 2022-09-08

## 2022-09-08 NOTE — RESULT ENCOUNTER NOTE
Kortney, can you help get the MRI images from [facility]? Samantha Humphrey MD on 9/8/2022 at 12:36 PM

## 2022-09-09 ENCOUNTER — MEDICAL CORRESPONDENCE (OUTPATIENT)
Dept: HEALTH INFORMATION MANAGEMENT | Facility: CLINIC | Age: 67
End: 2022-09-09

## 2022-09-14 ENCOUNTER — LAB (OUTPATIENT)
Dept: LAB | Facility: CLINIC | Age: 67
End: 2022-09-14

## 2022-09-14 ENCOUNTER — TELEPHONE (OUTPATIENT)
Dept: NEUROLOGY | Facility: CLINIC | Age: 67
End: 2022-09-14

## 2022-09-14 ENCOUNTER — OFFICE VISIT (OUTPATIENT)
Dept: NEUROLOGY | Facility: CLINIC | Age: 67
End: 2022-09-14
Attending: PSYCHIATRY & NEUROLOGY
Payer: MEDICARE

## 2022-09-14 VITALS — OXYGEN SATURATION: 98 % | DIASTOLIC BLOOD PRESSURE: 75 MMHG | HEART RATE: 71 BPM | SYSTOLIC BLOOD PRESSURE: 111 MMHG

## 2022-09-14 DIAGNOSIS — G35 MS (MULTIPLE SCLEROSIS) (H): ICD-10-CM

## 2022-09-14 DIAGNOSIS — G35 MS (MULTIPLE SCLEROSIS) (H): Primary | ICD-10-CM

## 2022-09-14 LAB
ALBUMIN SERPL BCG-MCNC: 4.4 G/DL (ref 3.5–5.2)
ALP SERPL-CCNC: 88 U/L (ref 35–104)
ALT SERPL W P-5'-P-CCNC: 15 U/L (ref 10–35)
AST SERPL W P-5'-P-CCNC: 34 U/L (ref 10–35)
BASOPHILS # BLD AUTO: 0 10E3/UL (ref 0–0.2)
BASOPHILS NFR BLD AUTO: 1 %
BILIRUB DIRECT SERPL-MCNC: <0.2 MG/DL (ref 0–0.3)
BILIRUB SERPL-MCNC: 0.4 MG/DL
EOSINOPHIL # BLD AUTO: 0 10E3/UL (ref 0–0.7)
EOSINOPHIL NFR BLD AUTO: 1 %
ERYTHROCYTE [DISTWIDTH] IN BLOOD BY AUTOMATED COUNT: 12.8 % (ref 10–15)
HCT VFR BLD AUTO: 44.4 % (ref 35–47)
HGB BLD-MCNC: 14.9 G/DL (ref 11.7–15.7)
IMM GRANULOCYTES # BLD: 0 10E3/UL
IMM GRANULOCYTES NFR BLD: 0 %
LYMPHOCYTES # BLD AUTO: 2.2 10E3/UL (ref 0.8–5.3)
LYMPHOCYTES NFR BLD AUTO: 37 %
MCH RBC QN AUTO: 29.6 PG (ref 26.5–33)
MCHC RBC AUTO-ENTMCNC: 33.6 G/DL (ref 31.5–36.5)
MCV RBC AUTO: 88 FL (ref 78–100)
MONOCYTES # BLD AUTO: 0.5 10E3/UL (ref 0–1.3)
MONOCYTES NFR BLD AUTO: 8 %
NEUTROPHILS # BLD AUTO: 3.1 10E3/UL (ref 1.6–8.3)
NEUTROPHILS NFR BLD AUTO: 53 %
NRBC # BLD AUTO: 0 10E3/UL
NRBC BLD AUTO-RTO: 0 /100
PLATELET # BLD AUTO: 222 10E3/UL (ref 150–450)
PROT SERPL-MCNC: 7.4 G/DL (ref 6.4–8.3)
RBC # BLD AUTO: 5.04 10E6/UL (ref 3.8–5.2)
WBC # BLD AUTO: 5.8 10E3/UL (ref 4–11)

## 2022-09-14 PROCEDURE — 86481 TB AG RESPONSE T-CELL SUSP: CPT | Performed by: PSYCHIATRY & NEUROLOGY

## 2022-09-14 PROCEDURE — 99214 OFFICE O/P EST MOD 30 MIN: CPT | Performed by: PSYCHIATRY & NEUROLOGY

## 2022-09-14 PROCEDURE — G0463 HOSPITAL OUTPT CLINIC VISIT: HCPCS

## 2022-09-14 PROCEDURE — 36415 COLL VENOUS BLD VENIPUNCTURE: CPT | Performed by: PATHOLOGY

## 2022-09-14 PROCEDURE — 85025 COMPLETE CBC W/AUTO DIFF WBC: CPT | Performed by: PATHOLOGY

## 2022-09-14 PROCEDURE — 80076 HEPATIC FUNCTION PANEL: CPT | Performed by: PATHOLOGY

## 2022-09-14 RX ORDER — PREDNISONE 50 MG/1
TABLET ORAL
Qty: 14 TABLET | Refills: 0 | Status: SHIPPED | OUTPATIENT
Start: 2022-09-14 | End: 2022-09-26

## 2022-09-14 NOTE — LETTER
9/14/2022       RE: Ruthie Aaron  [address] [address]     Dear Colleague,    Thank you for referring your patient, Ruthie Aaron, to the [facility] MULTIPLE SCLEROSIS CLINIC [place] at [facility]. Please see a copy of my visit note below.    Date of Service: 9/14/2022    [facility]   MS Clinic Evaluation     Subjective: 67 year old woman with HLD, osteoporosis, and multiple sclerosis who presents for evaluation of MS     She is here today to discuss results of MRI     She has started ampyra and is tolerating this well. Has noted a slight improvement in speed of gait.     Disease onset: Age 45 when she experienced tripping over her right foot and spasms  Progression onset: Mid 50s, manifesting with a right foot drop much more than the left    DMD history:   Avonex 4198-7419 flulike side effects, injection intolerance  Copaxone 7667-4235, discontinued due to lipoatrophy  Tecfidera 2017 for 2 months, discontinued due to flushing      No prior history of Ampyra trial    Allergies   Allergen Reactions     Sulfacetamide      Other reaction(s): hives       Current Outpatient Medications   Medication     alendronate (FOSAMAX) 70 MG tablet     baclofen (LIORESAL) 20 MG tablet     Dalfampridine (AMPYRA) 10 MG TB12     modafinil (PROVIGIL) 100 MG tablet     predniSONE (DELTASONE) 50 MG tablet     sertraline (ZOLOFT) 100 MG tablet     vitamin D3 (CHOLECALCIFEROL) 50 mcg (2000 units) tablet     No current facility-administered medications for this visit.        Past medical, surgical, social and family history was personally reviewed. Pertinent details noted above.     Physical Examination:   /75 (BP Location: Left arm, Patient Position: Chair, Cuff Size: Adult Regular)   Pulse 71   SpO2 98%     General: no acute distress    Tests/Imaging:     D 41    MRI brain   2015 - mod sev lesion burden with predominantly pvl, but also few jcl and  dwml, but no pf lesions  2022 - mild/mod growth of lesions, no distinct new lesions, none venkat enhancing    MRI cervical spine   2022 - 3 cord lesions, lesion right lat cord at c3 faint gd+    MRI thoracic spine   2022 - few small lesions noted, gd-       Assessment: 67 year old woman with secondary progressive multiple sclerosis who has been off disease modifying therapy for 5 years and no follow up imaging after discontinuation.     We reviewed imaging together today.  Faint enhancement of cord lesion at c3.  Patient has noted more difficulty lifting right leg. Advise trial of steroids to see if she can get any immediate improvement.     She will continue ampyra.     We are trying to get images from [facility] to compare the spinal cord studies.     Regardless, there is a noticeable progression/growth of lesions between studies.  Advise she start aubagio. Common risks and side effects were reviewed in detail.  Also discussed why kesimpta is not an ideal medication.     Plan:     - prednisone taper   - aubagio   - continue ampyra   - blood work today   - follow up in 3 months    Note was completed with the assistance of Dragon Fluency software which can often result in accidental word substitutions.     A total of 30 minutes on the date of service were spent in the care of this patient.     Samantha Humphrey MD on 9/14/2022 at 12:49 PM

## 2022-09-14 NOTE — TELEPHONE ENCOUNTER
Prior Authorization Specialty Medication Request    Medication/Dose: Aubagio 14 mg tablets  ICD code (if different than what is on RX):  Multiple Sclerosis, G35  Previously Tried and Failed:  Avonex, Copaxone, Tecfidera    Important Lab Values:   Rationale: Re-initiation of disease modifying therapy for demyelinating disease, please approve    Insurance Name: Medicare  Insurance ID: [id number]  Insurance Phone Number: [phone]    Pharmacy Information (if different than what is on RX)  Name:    Phone:

## 2022-09-14 NOTE — PROGRESS NOTES
Date of Service: 9/14/2022    [facility] Neurology   MS Clinic Evaluation     Subjective: 67 year old woman with HLD, osteoporosis, and multiple sclerosis who presents for evaluation of MS     She is here today to discuss results of MRI     She has started ampyra and is tolerating this well. Has noted a slight improvement in speed of gait.     Disease onset: Age 45 when she experienced tripping over her right foot and spasms  Progression onset: Mid 50s, manifesting with a right foot drop much more than the left    DMD history:   Avonex 1252-0261 flulike side effects, injection intolerance  Copaxone 3529-4045, discontinued due to lipoatrophy  Tecfidera 2017 for 2 months, discontinued due to flushing      No prior history of Ampyra trial    Allergies   Allergen Reactions     Sulfacetamide      Other reaction(s): hives       Current Outpatient Medications   Medication     alendronate (FOSAMAX) 70 MG tablet     baclofen (LIORESAL) 20 MG tablet     Dalfampridine (AMPYRA) 10 MG TB12     modafinil (PROVIGIL) 100 MG tablet     predniSONE (DELTASONE) 50 MG tablet     sertraline (ZOLOFT) 100 MG tablet     vitamin D3 (CHOLECALCIFEROL) 50 mcg (2000 units) tablet     No current facility-administered medications for this visit.        Past medical, surgical, social and family history was personally reviewed. Pertinent details noted above.     Physical Examination:   /75 (BP Location: Left arm, Patient Position: Chair, Cuff Size: Adult Regular)   Pulse 71   SpO2 98%     General: no acute distress    Tests/Imaging:     D 41    MRI brain   2015 - mod sev lesion burden with predominantly pvl, but also few jcl and dwml, but no pf lesions  2022 - mild/mod growth of lesions, no distinct new lesions, none venkat enhancing    MRI cervical spine   2022 - 3 cord lesions, lesion right lat cord at c3 faint gd+    MRI thoracic spine   2022 - few small lesions noted, gd-       Assessment: 67 year old woman with secondary progressive multiple  sclerosis who has been off disease modifying therapy for 5 years and no follow up imaging after discontinuation.     We reviewed imaging together today.  Faint enhancement of cord lesion at c3.  Patient has noted more difficulty lifting right leg. Advise trial of steroids to see if she can get any immediate improvement.     She will continue ampyra.     We are trying to get images from [facility] to compare the spinal cord studies.     Regardless, there is a noticeable progression/growth of lesions between studies.  Advise she start aubagio. Common risks and side effects were reviewed in detail.  Also discussed why kesimpta is not an ideal medication.     Plan:     - prednisone taper   - aubagio   - continue ampyra   - blood work today   - follow up in 3 months    Note was completed with the assistance of Dragon Fluency software which can often result in accidental word substitutions.     A total of 30 minutes on the date of service were spent in the care of this patient.   Samantha Humphrey MD on 9/14/2022 at 12:49 PM      25 foot walk from PT was 14.5 seconds  Samantha Humphrey MD on 9/16/2022 at 3:03 PM

## 2022-09-14 NOTE — PATIENT INSTRUCTIONS
There is some growth of the lesions in your brain     This can be related to slow (smoldering) inflammation     I recommend that you try steroids -- this gets the inflammation under control immediately     After the steroids you can start aubagio - this will help prevent inflammation in the long run   After starting aubagio you do need to have your blood work done once per month     Have baseline blood work done today    Follow up in 3 months

## 2022-09-15 NOTE — TELEPHONE ENCOUNTER
Prior Authorization Not Needed per Insurance    Medication: [id number] Tablets (NO PA NEEDED)    Insurance Company: Other (see comments)Comment:  [employer] PART D  Expected CoPay: $2624.89    Pharmacy Filling the Rx: [facility] [place] [place] [facility] [address] [facility]  Pharmacy Notified:    Patient Notified:      A PA is not needed at this time for Aubagio, but the cost is high due to the Medicare Part D donut hole/coverage gap.  Reaching out to [employer] to verify if PAP is an option.     Thank you,    Jeanne Ashton [facility]  Specialty Pharmacy Clinic Liaison - CardiologyNeurologyMultiple Sclerosis  [facility]  [address] [place] [address]  [address] [place] [address]  Ph: [phone] Fax: [phone]  [email or web address]

## 2022-09-16 LAB
GAMMA INTERFERON BACKGROUND BLD IA-ACNC: 0.06 IU/ML
M TB IFN-G BLD-IMP: NEGATIVE
M TB IFN-G CD4+ BCKGRND COR BLD-ACNC: 1.07 IU/ML
MITOGEN IGNF BCKGRD COR BLD-ACNC: -0.02 IU/ML
MITOGEN IGNF BCKGRD COR BLD-ACNC: -0.03 IU/ML
QUANTIFERON MITOGEN: 1.13 IU/ML
QUANTIFERON NIL TUBE: 0.06 IU/ML
QUANTIFERON TB1 TUBE: 0.03 IU/ML
QUANTIFERON TB2 TUBE: 0.04

## 2022-09-21 NOTE — TELEPHONE ENCOUNTER
Start form faxed to [employer] indication Patient Assistance is needed to due cost. Also sent to scanning for media tab.    Thank you,    Jeanne Ashton [facility]  Specialty Pharmacy Clinic Liaison - CardiologyNeurologyMultiple Sclerosis  [facility] [address]  [address] [place] [address]  Ph: [phone] Fax: [phone]  [email or web address]

## 2022-12-14 ENCOUNTER — OFFICE VISIT (OUTPATIENT)
Dept: NEUROLOGY | Facility: CLINIC | Age: 67
End: 2022-12-14
Attending: PSYCHIATRY & NEUROLOGY
Payer: MEDICARE

## 2022-12-14 VITALS
OXYGEN SATURATION: 95 % | SYSTOLIC BLOOD PRESSURE: 125 MMHG | DIASTOLIC BLOOD PRESSURE: 82 MMHG | WEIGHT: 149.1 LBS | HEART RATE: 81 BPM | BODY MASS INDEX: 28.17 KG/M2

## 2022-12-14 DIAGNOSIS — R41.0 EPISODE OF CONFUSION: ICD-10-CM

## 2022-12-14 DIAGNOSIS — G35 MS (MULTIPLE SCLEROSIS) (H): Primary | ICD-10-CM

## 2022-12-14 DIAGNOSIS — G82.20 PARAPARESIS (H): ICD-10-CM

## 2022-12-14 DIAGNOSIS — R25.2 SPASTICITY: ICD-10-CM

## 2022-12-14 DIAGNOSIS — R53.82 CHRONIC FATIGUE: ICD-10-CM

## 2022-12-14 PROCEDURE — 99214 OFFICE O/P EST MOD 30 MIN: CPT | Performed by: PSYCHIATRY & NEUROLOGY

## 2022-12-14 PROCEDURE — G0463 HOSPITAL OUTPT CLINIC VISIT: HCPCS | Performed by: PSYCHIATRY & NEUROLOGY

## 2022-12-14 ASSESSMENT — PAIN SCALES - GENERAL: PAINLEVEL: NO PAIN (0)

## 2022-12-14 NOTE — PATIENT INSTRUCTIONS
Continue sertraline     Resume taking dalfampridine (ampyra) for your walking   After 2 weeks of taking this medication, if you have not experienced recurrence of confusion you can try taking modafinil -- medicine for fatigue     I recommend holding off on baclofen for now   But reach out to me if you start to struggle with muscle spasms    I will discuss options for aubagio with our nursing staff    Follow up with me in 3-4 months

## 2022-12-14 NOTE — PROGRESS NOTES
Date of Service: 12/14/2022    [facility] Neurology   MS Clinic Evaluation     Subjective: 67 year old woman with HLD, osteoporosis, and multiple sclerosis who presents for evaluation of MS     Since her last visit with me she had an episode of confusion.  It is challenging because she does not remember many of the details of the confusion.  Believes that the episode occurred in November.  It lasted approximately 1 month.  She was concerned that symptoms may have been related to a medication.  However, it is challenging as she is not sure which medication she was taking.  She typically manages her medications independently.  She believes that she also took some old tizanidine that was in her home.  The confusion manifested with strange she dreams, symptoms were present upon waking.  She had some paranoia.  They have resolved.    Her last visit I had prescribed prednisone.  It seems that she took this in the end of September.  I verified receipt of this medication with the pharmacy.    She was having some dizziness or brain fog.  This lasted a couple weeks and then resolved.  This was present in the end of summer/early fall.    I had prescribed Aubagio.  However she has not been able to start this medication because it is not currently affordable.  Co-pay is $1700 per month.    Disease onset: Age 45 when she experienced tripping over her right foot and spasms  Progression onset: Mid 50s, manifesting with a right foot drop much more than the left    DMD history:   Avonex 3112-8721 flulike side effects, injection intolerance  Copaxone 1617-4924, discontinued due to lipoatrophy  Tecfidera 2017 for 2 months, discontinued due to flushing      No prior history of Ampyra trial    Allergies   Allergen Reactions     Sulfacetamide      Other reaction(s): hives       Current Outpatient Medications   Medication     alendronate (FOSAMAX) 70 MG tablet     baclofen (LIORESAL) 20 MG tablet     Dalfampridine (AMPYRA) 10 MG TB12      modafinil (PROVIGIL) 100 MG tablet     sertraline (ZOLOFT) 100 MG tablet     vitamin D3 (CHOLECALCIFEROL) 50 mcg (2000 units) tablet     No current facility-administered medications for this visit.        Past medical, surgical, social and family history was personally reviewed. Pertinent details noted above.     Physical Examination:   /82 (BP Location: Right arm, Patient Position: Sitting, Cuff Size: Adult Regular)   Pulse 81   Wt 67.6 kg (149 lb 1.6 oz)   SpO2 95%   BMI 28.17 kg/m      General: no acute distress  Cranial nerves:   VFFC  PERRL w/no RAPD  EOM full w/L GULSHAN and L CN VI   +L facial paresis  Hearing intact  No dysarthria   Motor:   Tone is spastic in the lower extremities  Bulk is normal                           R          L  Deltoid             5          5  Biceps             5          5  Triceps            5          5  Wrist ext          5          5  Finger ext        5          5-  Finger abd       5          5-     Hip flexion       4          4+  Knee flexion    4+        5  Knee ext          5          5  Ankle d/f          4          4+     Reflexes: brisk in lower extremities, babinski absent bilaterally  Gait: spastic gait with intermittent dragging of right toe    Tests/Imaging:     D 41    MRI brain   2015 - mod sev lesion burden with predominantly pvl, but also few jcl and dwml, but no pf lesions  2022 - mild/mod growth of lesions, no distinct new lesions, none venkat enhancing    MRI cervical spine   2022 - 3 cord lesions, lesion right lat cord at c3 faint gd+    MRI thoracic spine   2022 - few small lesions noted, gd-       Assessment: 67 year old woman with secondary progressive multiple sclerosis who has been off disease modifying therapy and did have evidence of mildly active disease on her most recent scan.    She had an episode of confusion with paranoia recently.  This may have been medication related.  Given that she is uncertain which medications she was taking at the  time or the doses, I have recommended gradually reintroducing her medications 1 at a time.  We discussed how this could be prednisone induced, though it seems that she took prednisone more than 1 month prior to the episode of confusion.  She had also received multiple vaccinations, though this was 1 month prior as well.    Her clinical examination appears improved, therefore I do not feel that an updated MRI brain is necessary.  However, I would have a low threshold to order an MRI if she does not experience recurrence of confusion with reintroduction of medications.    I discussed with my nursing staff the concerns regarding affordability of the medication.  They will assist in following up on programs that are available.    Plan:     -Continue sertraline  - Resume taking Ampyra  - If no return of confusion after 2 weeks, you can resume taking modafinil as needed  - Hold baclofen for now  - Follow-up in 4 months    Note was completed with the assistance of Dragon Fluency software which can often result in accidental word substitutions.     A total of 30 minutes on the date of service were spent in the care of this patient.   Samantha Humphrey MD on 12/14/2022 at 1:38 PM

## 2022-12-14 NOTE — LETTER
12/14/2022       RE: Ruthie Aaron  [address] [facility] [address]     Dear Colleague,    Thank you for referring your patient, Ruthie Aaron, to the [facility] [place] at [facility]. Please see a copy of my visit note below.    Date of Service: 12/14/2022    [facility] Neurology   MS Clinic Evaluation     Subjective: 67 year old woman with HLD, osteoporosis, and multiple sclerosis who presents for evaluation of MS     Since her last visit with me she had an episode of confusion.  It is challenging because she does not remember many of the details of the confusion.  Believes that the episode occurred in November.  It lasted approximately 1 month.  She was concerned that symptoms may have been related to a medication.  However, it is challenging as she is not sure which medication she was taking.  She typically manages her medications independently.  She believes that she also took some old tizanidine that was in her home.  The confusion manifested with strange she dreams, symptoms were present upon waking.  She had some paranoia.  They have resolved.    Her last visit I had prescribed prednisone.  It seems that she took this in the end of September.  I verified receipt of this medication with the pharmacy.    She was having some dizziness or brain fog.  This lasted a couple weeks and then resolved.  This was present in the end of summer/early fall.    I had prescribed Aubagio.  However she has not been able to start this medication because it is not currently affordable.  Co-pay is $1700 per month.    Disease onset: Age 45 when she experienced tripping over her right foot and spasms  Progression onset: Mid 50s, manifesting with a right foot drop much more than the left    DMD history:   Avonex 8667-2571 flulike side effects, injection intolerance  Copaxone 6134-8350, discontinued due to lipoatrophy  Tecfidera 2017 for 2 months,  discontinued due to flushing      No prior history of Ampyra trial    Allergies   Allergen Reactions     Sulfacetamide      Other reaction(s): hives       Current Outpatient Medications   Medication     alendronate (FOSAMAX) 70 MG tablet     baclofen (LIORESAL) 20 MG tablet     Dalfampridine (AMPYRA) 10 MG TB12     modafinil (PROVIGIL) 100 MG tablet     sertraline (ZOLOFT) 100 MG tablet     vitamin D3 (CHOLECALCIFEROL) 50 mcg (2000 units) tablet     No current facility-administered medications for this visit.        Past medical, surgical, social and family history was personally reviewed. Pertinent details noted above.     Physical Examination:   /82 (BP Location: Right arm, Patient Position: Sitting, Cuff Size: Adult Regular)   Pulse 81   Wt 67.6 kg (149 lb 1.6 oz)   SpO2 95%   BMI 28.17 kg/m      General: no acute distress  Cranial nerves:   VFFC  PERRL w/no RAPD  EOM full w/L GULSHAN and L CN VI   +L facial paresis  Hearing intact  No dysarthria   Motor:   Tone is spastic in the lower extremities  Bulk is normal                           R          L  Deltoid             5          5  Biceps             5          5  Triceps            5          5  Wrist ext          5          5  Finger ext        5          5-  Finger abd       5          5-     Hip flexion       4          4+  Knee flexion    4+        5  Knee ext          5          5  Ankle d/f          4          4+     Reflexes: brisk in lower extremities, babinski absent bilaterally  Gait: spastic gait with intermittent dragging of right toe    Tests/Imaging:     D 41    MRI brain   2015 - mod sev lesion burden with predominantly pvl, but also few jcl and dwml, but no pf lesions  2022 - mild/mod growth of lesions, no distinct new lesions, none venkat enhancing    MRI cervical spine   2022 - 3 cord lesions, lesion right lat cord at c3 faint gd+    MRI thoracic spine   2022 - few small lesions noted, gd-       Assessment: 67 year old woman with  secondary progressive multiple sclerosis who has been off disease modifying therapy and did have evidence of mildly active disease on her most recent scan.    She had an episode of confusion with paranoia recently.  This may have been medication related.  Given that she is uncertain which medications she was taking at the time or the doses, I have recommended gradually reintroducing her medications 1 at a time.  We discussed how this could be prednisone induced, though it seems that she took prednisone more than 1 month prior to the episode of confusion.  She had also received multiple vaccinations, though this was 1 month prior as well.    Her clinical examination appears improved, therefore I do not feel that an updated MRI brain is necessary.  However, I would have a low threshold to order an MRI if she does not experience recurrence of confusion with reintroduction of medications.    I discussed with my nursing staff the concerns regarding affordability of the medication.  They will assist in following up on programs that are available.    Plan:   -Continue sertraline  - Resume taking Ampyra  - If no return of confusion after 2 weeks, you can resume taking modafinil as needed  - Hold baclofen for now  - Follow-up in 4 months    Note was completed with the assistance of Dragon Fluency software which can often result in accidental word substitutions.     A total of 30 minutes on the date of service were spent in the care of this patient.   Samantha Humphrey MD on 12/14/2022 at 1:38 PM

## 2022-12-26 DIAGNOSIS — G35 RELAPSING REMITTING MULTIPLE SCLEROSIS (H): ICD-10-CM

## 2022-12-27 NOTE — TELEPHONE ENCOUNTER
Patient requesting refill of their sertraline; Patient was last seen in December and has follow up appointment in March with Dr. Humphrey. Pended rx to Dr. Humphrey for signature and will send electronically to the pharmacy once signed.    Amy Fernandez RN

## 2022-12-27 NOTE — TELEPHONE ENCOUNTER
Refill request for: sertraline 100mg  Directions: take one tablet by mouth once a day.      Pt is now seeing Dr. Humphrey in [place] for her MS.  Will route refill request to [facility]

## 2022-12-28 RX ORDER — SERTRALINE HYDROCHLORIDE 100 MG/1
TABLET, FILM COATED ORAL
Qty: 90 TABLET | Refills: 3 | Status: SHIPPED | OUTPATIENT
Start: 2022-12-28 | End: 2023-12-21

## 2023-01-06 ENCOUNTER — TELEPHONE (OUTPATIENT)
Dept: NEUROLOGY | Facility: CLINIC | Age: 68
End: 2023-01-06

## 2023-01-06 NOTE — TELEPHONE ENCOUNTER
Dr Humphrey prescribed Aubagio in September but pt has not been able to start due to cost. Called [employer] to check on possibility of patient assistance for Aubagio. They informed me that pt is not eligible for patient assistance program due to not meeting income requirement. They provided her with contact info for a few foundations. Pt contacted some of them but there was no funding available. Pt not eligible for copay assistance due to Medicare insurance. Routing to pharmacy liaison to see if there are any other options.    Madelaine Schmidt RN

## 2023-01-10 NOTE — TELEPHONE ENCOUNTER
Per pharmacy liaison, all grants are closed, and they are also all income based. Will inform Dr Humphrey.    Madelaine Schmidt RN

## 2023-02-06 DIAGNOSIS — G35 MS (MULTIPLE SCLEROSIS) (H): ICD-10-CM

## 2023-02-06 DIAGNOSIS — G82.20 PARAPARESIS (H): ICD-10-CM

## 2023-02-06 RX ORDER — DALFAMPRIDINE 10 MG/1
10 TABLET, FILM COATED, EXTENDED RELEASE ORAL 2 TIMES DAILY
Qty: 60 TABLET | Refills: 5 | Status: SHIPPED | OUTPATIENT
Start: 2023-02-06 | End: 2023-10-31

## 2023-02-06 NOTE — TELEPHONE ENCOUNTER
Received refill request for dalfampridine from [facility]; Patient was last seen in December and has follow up appointment in March with Dr Humphrey. Refilled per MS refill protocol.    Madelaine Schmidt RN

## 2023-03-22 ENCOUNTER — OFFICE VISIT (OUTPATIENT)
Dept: NEUROLOGY | Facility: CLINIC | Age: 68
End: 2023-03-22
Attending: PSYCHIATRY & NEUROLOGY
Payer: MEDICARE

## 2023-03-22 VITALS
HEART RATE: 81 BPM | WEIGHT: 143.8 LBS | SYSTOLIC BLOOD PRESSURE: 105 MMHG | DIASTOLIC BLOOD PRESSURE: 69 MMHG | OXYGEN SATURATION: 95 % | BODY MASS INDEX: 27.17 KG/M2

## 2023-03-22 DIAGNOSIS — G82.20 PARAPARESIS (H): ICD-10-CM

## 2023-03-22 DIAGNOSIS — G35 MS (MULTIPLE SCLEROSIS) (H): Primary | ICD-10-CM

## 2023-03-22 PROCEDURE — G0463 HOSPITAL OUTPT CLINIC VISIT: HCPCS

## 2023-03-22 PROCEDURE — G0463 HOSPITAL OUTPT CLINIC VISIT: HCPCS | Performed by: PSYCHIATRY & NEUROLOGY

## 2023-03-22 PROCEDURE — 99214 OFFICE O/P EST MOD 30 MIN: CPT | Performed by: PSYCHIATRY & NEUROLOGY

## 2023-03-22 ASSESSMENT — PAIN SCALES - GENERAL: PAINLEVEL: NO PAIN (0)

## 2023-03-22 NOTE — NURSING NOTE
Chief Complaint   Patient presents with     MS     RECHECK     3 month follow up      Vitals were taken and medications were reconciled.  Toby Magana, EMT  1:50 PM

## 2023-03-22 NOTE — PATIENT INSTRUCTIONS
Your walking is faster on ampyra    Please do notify me if any recurrent confusion spells     MRI in September     Discuss walker options with physical therapist    *[facility] has a pharmacy -- dalfampridine is $11 a month  Let me know if you want me to send a prescription there - you have to register with them first    Follow up after the MRI

## 2023-03-22 NOTE — LETTER
3/22/2023       RE: Ruthie Aaron  [address] [facility] [address]     Dear Colleague,    Thank you for referring your patient, Ruthie Aaron, to the [facility]ULTIPLE [facility] CLINIC [place] at [facility]. Please see a copy of my visit note below.    Date of Service: 3/22/2023    [facility] Neurology   MS Clinic Evaluation     Subjective: 68 year old woman with HLD, osteoporosis, and multiple sclerosis who presents for evaluation of MS     She is accompanied by her  Gilmer who assists in providing history.    Fortunately, there have been no recurrent episodes of significant confusion as occurred last fall.  Recall that she may have taken old tizanidine.  She also is now using a pillbox to better organize her medications.  This helps decrease the chance that she will overdose accidentally.    She remains active at home the best she can.  She is able to go up and down stairs.  He is using a cane to ambulate.  She often times will also well walk or hold onto her .  She has not had any falls since her last visit with me.    She continues to take dalfampridine and finds that this has been helpful for her gait.  Her stride has improved and she feels that she can move faster.    She takes modafinil occasionally.  This is effective for energy, but it does make it difficult for her to fall asleep at night.  Tries to limit the use.    Discussed pros and cons of starting Aubagio.    Disease onset: Age 45 when she experienced tripping over her right foot and spasms  Progression onset: Mid 50s, manifesting with a right foot drop much more than the left    DMD history:   Avonex 7201-6384 flulike side effects, injection intolerance  Copaxone 7108-2960, discontinued due to lipoatrophy  Tecfidera 2017 for 2 months, discontinued due to flushing        Allergies   Allergen Reactions     Sulfacetamide      Other reaction(s): hives       Current Outpatient  Medications   Medication     alendronate (FOSAMAX) 70 MG tablet     baclofen (LIORESAL) 20 MG tablet     dalfampridine (AMPYRA) 10 MG TB12 12 hr tablet     modafinil (PROVIGIL) 100 MG tablet     sertraline (ZOLOFT) 100 MG tablet     vitamin D3 (CHOLECALCIFEROL) 50 mcg (2000 units) tablet     No current facility-administered medications for this visit.        Past medical, surgical, social and family history was personally reviewed. Pertinent details noted above.     Physical Examination:   Wt 65.2 kg (143 lb 12.8 oz)   BMI 27.17 kg/m      General: no acute distress  25 foot walk 23 seconds    Tests/Imaging:     D 41    MRI brain   2015 - mod sev lesion burden with predominantly pvl, but also few jcl and dwml, but no pf lesions  2022 - mild/mod growth of lesions, no distinct new lesions, none venkat enhancing    MRI cervical spine   2022 - 3 cord lesions, lesion right lat cord at c3 faint gd+    MRI thoracic spine   2022 - few small lesions noted, gd-       Assessment: 68 year old woman with secondary progressive multiple sclerosis who has been off disease modifying therapy and did have evidence of mildly active disease on her most recent scan.    Given that she has remained relatively clinically stable I think that it is reasonable for her to remain off treatment given cost of care and probable minimal benefit based on her age.  I have recommended updating imaging in September of this year.  I would like to reexamine her at that time to assess for progression.  At that time we will make a decision as to whether or not it would be most appropriate for her to resume treatment.  We did discuss alternative options for obtaining her medication that may be more affordable.    She will continue with Ampyra for her gait.    She was encouraged to reach out to me if she has any recurrent spells of confusion    Plan:     -Continue sertraline  - Continue Ampyra  - Continue modafinil as needed  - Follow-up in 6 months after MRI is  completed    Note was completed with the assistance of Dragon Fluency software which can often result in accidental word substitutions.     A total of 30 minutes on the date of service were spent in the care of this patient.   Samantha Humphrey MD on 3/22/2023 at 1:53 PM

## 2023-03-22 NOTE — PROGRESS NOTES
Date of Service: 3/22/2023    [facility]   MS Clinic Evaluation     Subjective: 68 year old woman with HLD, osteoporosis, and multiple sclerosis who presents for evaluation of MS     She is accompanied by her  Gilmer who assists in providing history.    Fortunately, there have been no recurrent episodes of significant confusion as occurred last fall.  Recall that she may have taken old tizanidine.  She also is now using a pillbox to better organize her medications.  This helps decrease the chance that she will overdose accidentally.    She remains active at home the best she can.  She is able to go up and down stairs.  He is using a cane to ambulate.  She often times will also well walk or hold onto her .  She has not had any falls since her last visit with me.    She continues to take dalfampridine and finds that this has been helpful for her gait.  Her stride has improved and she feels that she can move faster.    She takes modafinil occasionally.  This is effective for energy, but it does make it difficult for her to fall asleep at night.  Tries to limit the use.    Discussed pros and cons of starting Aubagio.    Disease onset: Age 45 when she experienced tripping over her right foot and spasms  Progression onset: Mid 50s, manifesting with a right foot drop much more than the left    DMD history:   Avonex [id number] flulike side effects, injection intolerance  Copaxone [id number], discontinued due to lipoatrophy  Tecfidera 2017 for 2 months, discontinued due to flushing        Allergies   Allergen Reactions     Sulfacetamide      Other reaction(s): hives       Current Outpatient Medications   Medication     alendronate (FOSAMAX) 70 MG tablet     baclofen (LIORESAL) 20 MG tablet     dalfampridine (AMPYRA) 10 MG TB12 12 hr tablet     modafinil (PROVIGIL) 100 MG tablet     sertraline (ZOLOFT) 100 MG tablet     vitamin D3 (CHOLECALCIFEROL) 50 mcg (2000 units) tablet     No current  facility-administered medications for this visit.        Past medical, surgical, social and family history was personally reviewed. Pertinent details noted above.     Physical Examination:   Wt 65.2 kg (143 lb 12.8 oz)   BMI 27.17 kg/m      General: no acute distress  25 foot walk 23 seconds    Tests/Imaging:     D 41    MRI brain   2015 - mod sev lesion burden with predominantly pvl, but also few jcl and dwml, but no pf lesions  2022 - mild/mod growth of lesions, no distinct new lesions, none venkat enhancing    MRI cervical spine   2022 - 3 cord lesions, lesion right lat cord at c3 faint gd+    MRI thoracic spine   2022 - few small lesions noted, gd-       Assessment: 68 year old woman with secondary progressive multiple sclerosis who has been off disease modifying therapy and did have evidence of mildly active disease on her most recent scan.    Given that she has remained relatively clinically stable I think that it is reasonable for her to remain off treatment given cost of care and probable minimal benefit based on her age.  I have recommended updating imaging in September of this year.  I would like to reexamine her at that time to assess for progression.  At that time we will make a decision as to whether or not it would be most appropriate for her to resume treatment.  We did discuss alternative options for obtaining her medication that may be more affordable.    She will continue with Ampyra for her gait.    She was encouraged to reach out to me if she has any recurrent spells of confusion    Plan:     -Continue sertraline  - Continue Ampyra  - Continue modafinil as needed  - Follow-up in 6 months after MRI is completed    Note was completed with the assistance of Dragon Fluency software which can often result in accidental word substitutions.     A total of 30 minutes on the date of service were spent in the care of this patient.   Samantha Humphrey MD on 3/22/2023 at 1:53 PM

## 2023-06-25 ENCOUNTER — HEALTH MAINTENANCE LETTER (OUTPATIENT)
Age: 68
End: 2023-06-25

## 2023-07-28 ENCOUNTER — TELEPHONE (OUTPATIENT)
Dept: NEUROLOGY | Facility: CLINIC | Age: 68
End: 2023-07-28
Payer: MEDICARE

## 2023-07-28 DIAGNOSIS — R53.82 CHRONIC FATIGUE: ICD-10-CM

## 2023-07-28 DIAGNOSIS — G35 MS (MULTIPLE SCLEROSIS) (H): ICD-10-CM

## 2023-07-28 RX ORDER — MODAFINIL 100 MG/1
100-200 TABLET ORAL DAILY PRN
Qty: 60 TABLET | Refills: 5 | Status: ON HOLD | OUTPATIENT
Start: 2023-07-28 | End: 2023-08-30

## 2023-07-28 NOTE — TELEPHONE ENCOUNTER
Patient requesting refill of their modafinil; Patient was last seen in March and has follow up appointment in September with Dr. Humphrey, Pended rx to Dr. Humphrey for signature and will send electronically to the pharmacy once signed.    Amy Fernandez RN

## 2023-07-28 NOTE — TELEPHONE ENCOUNTER
[facility] Call Center    Phone Message    May a detailed message be left on voicemail: yes     Reason for Call: Medication Refill Request    Has the patient contacted the pharmacy for the refill? Yes   Name of medication being requested: modafinil (PROVIGIL) 100 MG tablet [[id number]] (Order [id number]   Provider who prescribed the medication: Crabtree  Pharmacy: [facility]-   [facility] - [place], [place] - [address]     Date medication is needed: asap- out of Rx and looking to expedited asap.     Please advise.        Action Taken: Other: [facility]    Travel Screening: Not Applicable

## 2023-07-31 NOTE — TELEPHONE ENCOUNTER
[facility] Call Center    Phone Message    May a detailed message be left on voicemail: yes     Reason for Call: Medication Question or concern regarding medication   Prescription Clarification  Name of Medication: modafinil (PROVIGIL) 100 MG tablet   Prescribing Provider: Samantha Humphrey MD   Pharmacy: N/A   What on the order needs clarification? Pt is calling back because she left a message about the refill of this medication and would like a nurse to give her a call back to discuss. Please contact pt when available.      Action Taken: Message routed to:  [facility]  MS    Travel Screening: Not Applicable

## 2023-07-31 NOTE — TELEPHONE ENCOUNTER
VMM left for pt informing her that modafinil rx was sent to pharmacy on Friday 7/28. Asked pt to call back if she has additional questions.     Madelaine Schmidt RN

## 2023-08-28 ENCOUNTER — HOSPITAL ENCOUNTER (OUTPATIENT)
Dept: MRI IMAGING | Facility: HOSPITAL | Age: 68
Discharge: HOME | End: 2023-08-28
Attending: PSYCHIATRY & NEUROLOGY
Payer: MEDICARE

## 2023-08-28 DIAGNOSIS — G35 MS (MULTIPLE SCLEROSIS) (H): ICD-10-CM

## 2023-08-28 PROCEDURE — 70553 MRI BRAIN STEM W/O & W/DYE: CPT | Mod: MG

## 2023-08-28 PROCEDURE — G1010 CDSM STANSON: HCPCS

## 2023-08-28 PROCEDURE — A9585 GADOBUTROL INJECTION: HCPCS | Mod: JZ | Performed by: PSYCHIATRY & NEUROLOGY

## 2023-08-28 PROCEDURE — 255N000002 HC RX 255 OP 636: Mod: JZ | Performed by: PSYCHIATRY & NEUROLOGY

## 2023-08-28 RX ORDER — GADOBUTROL 604.72 MG/ML
6.5 INJECTION INTRAVENOUS ONCE
Status: COMPLETED | OUTPATIENT
Start: 2023-08-28 | End: 2023-08-28

## 2023-08-28 RX ADMIN — GADOBUTROL 6.5 ML: 604.72 INJECTION INTRAVENOUS at 17:58

## 2023-08-29 ENCOUNTER — HOSPITAL ENCOUNTER (OUTPATIENT)
Facility: HOSPITAL | Age: 68
Setting detail: OBSERVATION
Discharge: HOME | End: 2023-08-30
Attending: EMERGENCY MEDICINE | Admitting: STUDENT IN AN ORGANIZED HEALTH CARE EDUCATION/TRAINING PROGRAM
Payer: MEDICARE

## 2023-08-29 DIAGNOSIS — R53.82 CHRONIC FATIGUE: ICD-10-CM

## 2023-08-29 DIAGNOSIS — N30.00 ACUTE CYSTITIS WITHOUT HEMATURIA: Primary | ICD-10-CM

## 2023-08-29 DIAGNOSIS — G35 MULTIPLE SCLEROSIS (H): ICD-10-CM

## 2023-08-29 DIAGNOSIS — R40.0 SOMNOLENCE: ICD-10-CM

## 2023-08-29 DIAGNOSIS — G35 MS (MULTIPLE SCLEROSIS) (H): ICD-10-CM

## 2023-08-29 PROBLEM — K63.5 POLYP OF COLON: Status: ACTIVE | Noted: 2017-02-21

## 2023-08-29 PROBLEM — K64.9 HEMORRHOIDS: Status: ACTIVE | Noted: 2017-02-21

## 2023-08-29 PROBLEM — D12.5 BENIGN NEOPLASM OF SIGMOID COLON: Status: ACTIVE | Noted: 2017-02-23

## 2023-08-29 PROBLEM — Z86.0100 HISTORY OF COLONIC POLYPS: Status: ACTIVE | Noted: 2017-02-23

## 2023-08-29 PROBLEM — J30.2 SEASONAL ALLERGIES: Status: ACTIVE | Noted: 2023-08-29

## 2023-08-29 PROBLEM — D12.0 BENIGN NEOPLASM OF CECUM: Status: ACTIVE | Noted: 2017-02-23

## 2023-08-29 LAB
ALBUMIN SERPL BCG-MCNC: 4.2 G/DL (ref 3.5–5.2)
ALBUMIN UR-MCNC: 10 MG/DL
ALP SERPL-CCNC: 94 U/L (ref 35–104)
ALT SERPL W P-5'-P-CCNC: 14 U/L (ref 0–50)
ANION GAP SERPL CALCULATED.3IONS-SCNC: 12 MMOL/L (ref 7–15)
APPEARANCE UR: CLEAR
AST SERPL W P-5'-P-CCNC: 39 U/L (ref 0–45)
BACTERIA #/AREA URNS HPF: ABNORMAL /HPF
BASOPHILS # BLD AUTO: 0 10E3/UL (ref 0–0.2)
BASOPHILS NFR BLD AUTO: 0 %
BILIRUB DIRECT SERPL-MCNC: <0.2 MG/DL (ref 0–0.3)
BILIRUB SERPL-MCNC: 0.5 MG/DL
BILIRUB UR QL STRIP: NEGATIVE
BUN SERPL-MCNC: 13.6 MG/DL (ref 8–23)
CALCIUM SERPL-MCNC: 9.2 MG/DL (ref 8.8–10.2)
CHLORIDE SERPL-SCNC: 103 MMOL/L (ref 98–107)
COLOR UR AUTO: YELLOW
CREAT SERPL-MCNC: 0.59 MG/DL (ref 0.51–0.95)
CRP SERPL-MCNC: <3 MG/L
DEPRECATED HCO3 PLAS-SCNC: 24 MMOL/L (ref 22–29)
EOSINOPHIL # BLD AUTO: 0 10E3/UL (ref 0–0.7)
EOSINOPHIL NFR BLD AUTO: 0 %
ERYTHROCYTE [DISTWIDTH] IN BLOOD BY AUTOMATED COUNT: 13.2 % (ref 10–15)
FLUAV RNA SPEC QL NAA+PROBE: NEGATIVE
FLUBV RNA RESP QL NAA+PROBE: NEGATIVE
GFR SERPL CREATININE-BSD FRML MDRD: >90 ML/MIN/1.73M2
GLUCOSE BLDC GLUCOMTR-MCNC: 111 MG/DL (ref 70–99)
GLUCOSE SERPL-MCNC: 118 MG/DL (ref 70–99)
GLUCOSE UR STRIP-MCNC: NEGATIVE MG/DL
HCT VFR BLD AUTO: 44.1 % (ref 35–47)
HGB BLD-MCNC: 14.9 G/DL (ref 11.7–15.7)
HGB UR QL STRIP: NEGATIVE
HOLD SPECIMEN: NORMAL
HOLD SPECIMEN: NORMAL
IMM GRANULOCYTES # BLD: 0 10E3/UL
IMM GRANULOCYTES NFR BLD: 0 %
KETONES UR STRIP-MCNC: NEGATIVE MG/DL
LEUKOCYTE ESTERASE UR QL STRIP: ABNORMAL
LIPASE SERPL-CCNC: 37 U/L (ref 13–60)
LYMPHOCYTES # BLD AUTO: 1.3 10E3/UL (ref 0.8–5.3)
LYMPHOCYTES NFR BLD AUTO: 16 %
MAGNESIUM SERPL-MCNC: 2.1 MG/DL (ref 1.7–2.3)
MCH RBC QN AUTO: 29.4 PG (ref 26.5–33)
MCHC RBC AUTO-ENTMCNC: 33.8 G/DL (ref 31.5–36.5)
MCV RBC AUTO: 87 FL (ref 78–100)
MONOCYTES # BLD AUTO: 0.5 10E3/UL (ref 0–1.3)
MONOCYTES NFR BLD AUTO: 6 %
MUCOUS THREADS #/AREA URNS LPF: PRESENT /LPF
NEUTROPHILS # BLD AUTO: 6.5 10E3/UL (ref 1.6–8.3)
NEUTROPHILS NFR BLD AUTO: 78 %
NITRATE UR QL: POSITIVE
NRBC # BLD AUTO: 0 10E3/UL
NRBC BLD AUTO-RTO: 0 /100
PH UR STRIP: 5.5 [PH] (ref 5–7)
PLATELET # BLD AUTO: 259 10E3/UL (ref 150–450)
POTASSIUM SERPL-SCNC: 4.8 MMOL/L (ref 3.4–5.3)
PROCALCITONIN SERPL IA-MCNC: <0.02 NG/ML
PROT SERPL-MCNC: 7.4 G/DL (ref 6.4–8.3)
RBC # BLD AUTO: 5.07 10E6/UL (ref 3.8–5.2)
RBC URINE: 0 /HPF
RSV RNA SPEC NAA+PROBE: NEGATIVE
SARS-COV-2 RNA RESP QL NAA+PROBE: NEGATIVE
SODIUM SERPL-SCNC: 139 MMOL/L (ref 136–145)
SP GR UR STRIP: 1.03 (ref 1–1.03)
TSH SERPL DL<=0.005 MIU/L-ACNC: 4.11 UIU/ML (ref 0.3–4.2)
UROBILINOGEN UR STRIP-MCNC: <2 MG/DL
WBC # BLD AUTO: 8.3 10E3/UL (ref 4–11)
WBC URINE: 4 /HPF

## 2023-08-29 PROCEDURE — 87637 SARSCOV2&INF A&B&RSV AMP PRB: CPT | Performed by: EMERGENCY MEDICINE

## 2023-08-29 PROCEDURE — 85025 COMPLETE CBC W/AUTO DIFF WBC: CPT | Performed by: EMERGENCY MEDICINE

## 2023-08-29 PROCEDURE — 99285 EMERGENCY DEPT VISIT HI MDM: CPT | Mod: 25

## 2023-08-29 PROCEDURE — 86140 C-REACTIVE PROTEIN: CPT | Performed by: EMERGENCY MEDICINE

## 2023-08-29 PROCEDURE — 81001 URINALYSIS AUTO W/SCOPE: CPT | Performed by: EMERGENCY MEDICINE

## 2023-08-29 PROCEDURE — 84443 ASSAY THYROID STIM HORMONE: CPT | Performed by: EMERGENCY MEDICINE

## 2023-08-29 PROCEDURE — 250N000011 HC RX IP 250 OP 636: Performed by: EMERGENCY MEDICINE

## 2023-08-29 PROCEDURE — 82248 BILIRUBIN DIRECT: CPT | Performed by: EMERGENCY MEDICINE

## 2023-08-29 PROCEDURE — 96374 THER/PROPH/DIAG INJ IV PUSH: CPT

## 2023-08-29 PROCEDURE — 96375 TX/PRO/DX INJ NEW DRUG ADDON: CPT

## 2023-08-29 PROCEDURE — 99223 1ST HOSP IP/OBS HIGH 75: CPT | Mod: AI | Performed by: STUDENT IN AN ORGANIZED HEALTH CARE EDUCATION/TRAINING PROGRAM

## 2023-08-29 PROCEDURE — 87186 SC STD MICRODIL/AGAR DIL: CPT | Performed by: EMERGENCY MEDICINE

## 2023-08-29 PROCEDURE — 83690 ASSAY OF LIPASE: CPT | Performed by: EMERGENCY MEDICINE

## 2023-08-29 PROCEDURE — 250N000011 HC RX IP 250 OP 636: Mod: JZ | Performed by: STUDENT IN AN ORGANIZED HEALTH CARE EDUCATION/TRAINING PROGRAM

## 2023-08-29 PROCEDURE — 82962 GLUCOSE BLOOD TEST: CPT

## 2023-08-29 PROCEDURE — 250N000013 HC RX MED GY IP 250 OP 250 PS 637: Performed by: STUDENT IN AN ORGANIZED HEALTH CARE EDUCATION/TRAINING PROGRAM

## 2023-08-29 PROCEDURE — G0378 HOSPITAL OBSERVATION PER HR: HCPCS

## 2023-08-29 PROCEDURE — 83735 ASSAY OF MAGNESIUM: CPT | Performed by: EMERGENCY MEDICINE

## 2023-08-29 PROCEDURE — 250N000013 HC RX MED GY IP 250 OP 250 PS 637: Performed by: EMERGENCY MEDICINE

## 2023-08-29 PROCEDURE — 99215 OFFICE O/P EST HI 40 MIN: CPT | Performed by: PSYCHIATRY & NEUROLOGY

## 2023-08-29 PROCEDURE — 36415 COLL VENOUS BLD VENIPUNCTURE: CPT | Performed by: EMERGENCY MEDICINE

## 2023-08-29 PROCEDURE — 80048 BASIC METABOLIC PNL TOTAL CA: CPT | Performed by: EMERGENCY MEDICINE

## 2023-08-29 PROCEDURE — 84145 PROCALCITONIN (PCT): CPT | Performed by: EMERGENCY MEDICINE

## 2023-08-29 PROCEDURE — 93005 ELECTROCARDIOGRAM TRACING: CPT | Performed by: EMERGENCY MEDICINE

## 2023-08-29 RX ORDER — ACETAMINOPHEN 500 MG
TABLET ORAL
COMMUNITY
End: 2023-08-29

## 2023-08-29 RX ORDER — METHYLPREDNISOLONE SODIUM SUCCINATE 1 G/16ML
1000 INJECTION, POWDER, LYOPHILIZED, FOR SOLUTION INTRAMUSCULAR; INTRAVENOUS ONCE
Status: COMPLETED | OUTPATIENT
Start: 2023-08-29 | End: 2023-08-29

## 2023-08-29 RX ORDER — CEFTRIAXONE 1 G/1
1 INJECTION, POWDER, FOR SOLUTION INTRAMUSCULAR; INTRAVENOUS
Status: DISCONTINUED | OUTPATIENT
Start: 2023-08-29 | End: 2023-08-30 | Stop reason: HOSPADM

## 2023-08-29 RX ORDER — ACETAMINOPHEN 325 MG/1
650 TABLET ORAL EVERY 6 HOURS PRN
Status: DISCONTINUED | OUTPATIENT
Start: 2023-08-29 | End: 2023-08-30 | Stop reason: HOSPADM

## 2023-08-29 RX ORDER — ROSUVASTATIN CALCIUM 20 MG/1
TABLET, COATED ORAL
COMMUNITY
End: 2023-08-29

## 2023-08-29 RX ORDER — ONDANSETRON 2 MG/ML
4 INJECTION INTRAMUSCULAR; INTRAVENOUS EVERY 6 HOURS PRN
Status: DISCONTINUED | OUTPATIENT
Start: 2023-08-29 | End: 2023-08-30 | Stop reason: HOSPADM

## 2023-08-29 RX ORDER — BACLOFEN 10 MG/1
10 TABLET ORAL AT BEDTIME
Status: DISCONTINUED | OUTPATIENT
Start: 2023-08-29 | End: 2023-08-30 | Stop reason: HOSPADM

## 2023-08-29 RX ORDER — ACETAMINOPHEN 650 MG/1
650 SUPPOSITORY RECTAL EVERY 6 HOURS PRN
Status: DISCONTINUED | OUTPATIENT
Start: 2023-08-29 | End: 2023-08-30 | Stop reason: HOSPADM

## 2023-08-29 RX ORDER — MODAFINIL 100 MG/1
100-200 TABLET ORAL DAILY PRN
Status: DISCONTINUED | OUTPATIENT
Start: 2023-08-30 | End: 2023-08-30

## 2023-08-29 RX ORDER — CETIRIZINE HYDROCHLORIDE 10 MG/1
TABLET ORAL
COMMUNITY
Start: 2023-04-04 | End: 2023-08-29

## 2023-08-29 RX ORDER — ZOSTER VACCINE RECOMBINANT, ADJUVANTED 50 MCG/0.5
KIT INTRAMUSCULAR
COMMUNITY
Start: 2022-10-01 | End: 2023-08-29

## 2023-08-29 RX ORDER — METHYLPREDNISOLONE SODIUM SUCCINATE 1 G/16ML
1000 INJECTION, POWDER, LYOPHILIZED, FOR SOLUTION INTRAMUSCULAR; INTRAVENOUS ONCE
Status: DISCONTINUED | OUTPATIENT
Start: 2023-08-29 | End: 2023-08-29

## 2023-08-29 RX ORDER — AMOXICILLIN 250 MG
1 CAPSULE ORAL 2 TIMES DAILY PRN
Status: DISCONTINUED | OUTPATIENT
Start: 2023-08-29 | End: 2023-08-30 | Stop reason: HOSPADM

## 2023-08-29 RX ORDER — AMOXICILLIN 250 MG
2 CAPSULE ORAL 2 TIMES DAILY PRN
Status: DISCONTINUED | OUTPATIENT
Start: 2023-08-29 | End: 2023-08-30 | Stop reason: HOSPADM

## 2023-08-29 RX ORDER — MODAFINIL 100 MG/1
200 TABLET ORAL ONCE
Status: COMPLETED | OUTPATIENT
Start: 2023-08-29 | End: 2023-08-29

## 2023-08-29 RX ORDER — ONDANSETRON 4 MG/1
4 TABLET, ORALLY DISINTEGRATING ORAL EVERY 6 HOURS PRN
Status: DISCONTINUED | OUTPATIENT
Start: 2023-08-29 | End: 2023-08-30 | Stop reason: HOSPADM

## 2023-08-29 RX ORDER — MODAFINIL 100 MG/1
100-200 TABLET ORAL DAILY PRN
Status: DISCONTINUED | OUTPATIENT
Start: 2023-08-29 | End: 2023-08-29

## 2023-08-29 RX ORDER — FLUTICASONE PROPIONATE 50 MCG
SPRAY, SUSPENSION (ML) NASAL
COMMUNITY
Start: 2023-04-04 | End: 2023-08-29

## 2023-08-29 RX ORDER — DALFAMPRIDINE 10 MG/1
10 TABLET, FILM COATED, EXTENDED RELEASE ORAL 2 TIMES DAILY
Status: DISCONTINUED | OUTPATIENT
Start: 2023-08-29 | End: 2023-08-30 | Stop reason: HOSPADM

## 2023-08-29 RX ADMIN — SERTRALINE HYDROCHLORIDE 100 MG: 100 TABLET ORAL at 16:16

## 2023-08-29 RX ADMIN — DALFAMPRIDINE 10 MG: 10 TABLET, FILM COATED, EXTENDED RELEASE ORAL at 21:19

## 2023-08-29 RX ADMIN — MODAFINIL 200 MG: 100 TABLET ORAL at 14:16

## 2023-08-29 RX ADMIN — CEFTRIAXONE SODIUM 1 G: 1 INJECTION, POWDER, FOR SOLUTION INTRAMUSCULAR; INTRAVENOUS at 16:10

## 2023-08-29 RX ADMIN — METHYLPREDNISOLONE SODIUM SUCCINATE 1000 MG: 1 INJECTION, POWDER, FOR SOLUTION INTRAMUSCULAR; INTRAVENOUS at 14:09

## 2023-08-29 ASSESSMENT — ACTIVITIES OF DAILY LIVING (ADL)
DEPENDENT_IADLS:: CLEANING;COOKING;MEAL PREPARATION;MEDICATION MANAGEMENT;TRANSPORTATION
ADLS_ACUITY_SCORE: 37
ADLS_ACUITY_SCORE: 35
ADLS_ACUITY_SCORE: 37
ADLS_ACUITY_SCORE: 37
ADLS_ACUITY_SCORE: 35
ADLS_ACUITY_SCORE: 35

## 2023-08-29 NOTE — ED NOTES
[facility] ED Handoff Report    ED Chief Complaint: alt LOC    ED Diagnosis:  (R40.0) Somnolence  Comment:   Plan: obs    (G35) Multiple sclerosis (H)  Comment:   Plan: meds       PMH:  History reviewed. No pertinent past medical history.     Code Status:  Full Code     Falls Risk: Yes Band: Applied    Current Living Situation/Residence: lives with a significant other     Elimination Status: Continent: No     Activity Level: 2 assist    Patients Preferred Language:  English     Needed: No    Vital Signs:  /63   Pulse 72   Temp 98  F (36.7  C) (Tympanic)   Resp 22   Wt 65.8 kg (145 lb)   SpO2 95%   BMI 27.40 kg/m       Cardiac Rhythm:     Pain Score: 1/10    Is the Patient Confused:  Yes    Last Food or Drink: 08/29/23 at     Focused Assessment:      Tests Performed: Done: Labs and Imaging    Treatments Provided:  meds, labs    Family Dynamics/Concerns: No    Family Updated On Visitor Policy: Yes    Plan of Care Communicated to Family: Yes    Who Was Updated about Plan of Care: self    Belongings Checklist Done and Signed by Patient: Yes    Medications sent with patient:     Covid: asymptomatic     Additional Information:     RN: Ava Charlton RN   8/29/2023 5:17 PM

## 2023-08-29 NOTE — ED NOTES
Pt nonverbal, does not open eyes, follows commands to squeeze hands, lifts arms off bed is able to wiggle toes. Was able to take small sip of water with much hesitation.

## 2023-08-29 NOTE — H&P
[facility]    History and Physical - Hospitalist Service       Date of Admission:  8/29/2023    Assessment & Plan      Ruthie Aaron is a 68 year old female admitted on 8/29/2023. She has history of MS and presented with progressive fatigue over a couple of weeks and 2 episodes of altered mental status in the past couple of days.  Since yesterday, patient was not able to open her eyes despite has been trying to wake her up by walking around the house and giving her cold bath.  2 days ago similar episode occurred but eventually she was able to wake up after shower.  Per her , she ran out of of modafinil for a couple of weeks and he also tried to decrease the dose of baclofen hoping to improve her mentation.  She had MRI of the brain and cervical spine done yesterday which showed chronic changes with no new active lesions.  Otherwise there is no history of fever, chills, vomiting, abdominal pain, chest pain, shortness of breath. labs are unremarkable. During my examination, patient was awake and was able to answered my questions but slightly confused with dates and place. Neurology was consulted in the ED and recommended to start her on IV Solu-Medrol.  Modafinil was resumed.  Patient will be admitted for altered mental status evaluation and management.    Altered mental status  History of MS  - No new lesions on MRI brain/cervical spine  -Neurology consulted in the ED  -Status post IV Solu-Medrol  -Resume home meds    UA suggestive of urinary tract infection  -Likely contributing to confusion  -Start ceftriaxone IV  -Await urine culture results         Diet: 2 Gram Sodium Diet  DVT Prophylaxis: Pneumatic Compression Devices  Angulo Catheter: Not present  Lines: None     Cardiac Monitoring: None  Code Status: Full Code    Clinically Significant Risk Factors Present on Admission                                  Disposition Plan      Expected Discharge Date: 08/30/2023                   Diamond Barth MD  Hospitalist Service  [facility]  Securely message with [email or web address] (more info)  Text page via [email or web address] Paging/Directory     ______________________________________________________________________    Chief Complaint   Altered mental status    History is obtained from the Spouse and patient    History of Present Illness   Ruthie Aaron is a 68 year old female who has history of MS and presented with progressive fatigue over a couple of weeks and 2 episodes of altered mental status in the past couple of days.  Since yesterday, patient was not able to open her eyes despite has been trying to wake her up by walking around the house and giving her cold bath.  2 days ago similar episode occurred but eventually she was able to wake up after shower.  Per her , she ran out of of modafinil for a couple of weeks and he also tried to decrease the dose of baclofen hoping to improve her mentation.  She had MRI of the brain and cervical spine done yesterday which showed chronic changes with no new active lesions.  Otherwise there is no history of fever, chills, vomiting, abdominal pain, chest pain, shortness of breath. labs are unremarkable. During my examination, patient was awake and was able to answered my questions but slightly confused with dates and place. Neurology was consulted in the ED and recommended to start her on IV Solu-Medrol.  Modafinil was resumed.  Patient will be admitted for altered mental status evaluation and management.      Past Medical History    History reviewed. No pertinent past medical history.    Past Surgical History   History reviewed. No pertinent surgical history.    Prior to Admission Medications   Prior to Admission Medications   Prescriptions Last Dose Informant Patient Reported? Taking?   baclofen (LIORESAL) 20 MG tablet 8/27/2023 at hs  No Yes   Sig: Take 1 tablet (20 mg) by mouth 2 times daily   Patient taking differently: Take 10 mg by mouth  At Bedtime   dalfampridine (AMPYRA) 10 MG TB12 12 hr tablet 8/28/2023 at pm  No Yes   Sig: Take 1 tablet (10 mg) by mouth 2 times daily   modafinil (PROVIGIL) 100 MG tablet Past Month  No Yes   Sig: Take 1-2 tablets (100-200 mg) by mouth daily as needed (ms related fatigue)   sertraline (ZOLOFT) 100 MG tablet 8/28/2023 at am  No Yes   Sig: TAKE 1 TABLET BY MOUTH  DAILY      Facility-Administered Medications: None        Review of Systems    The 10 point Review of Systems is negative other than noted in the HPI or here.      Physical Exam   Vital Signs: Temp: 98  F (36.7  C) Temp src: Tympanic BP: 136/66 Pulse: 77   Resp: 18 SpO2: 96 % O2 Device: None (Room air)    Weight: 145 lbs 0 oz    GEN: Alert and oriented to person. Not in acute distress.  HEENT: Atraumatic, mucous membrane- moist and pink.  Chest: Bilateral air entry.  CVS: S1S2 regular.   Abdomen: Soft. Non-tender, non-distended. No organomegaly. No guarding or rigidity. Bowel sounds active.   Extremities: No pedal edema.  CNS: No involuntary movements.  Skin: no cyanosis or clubbing.     Medical Decision Making             Data

## 2023-08-29 NOTE — PHARMACY-ADMISSION MEDICATION HISTORY
Pharmacist Admission Medication History    Admission medication history is complete. The information provided in this note is only as accurate as the sources available at the time of the update.    Medication reconciliation/reorder completed by provider prior to medication history? No    Information Source(s): Family member, Caregiver, Clinic records, and CareEverywhere/SureScripts via in-person    Pertinent Information: none    Changes made to PTA medication list:  Added: None  Deleted: alendronate, ca+d, cetirizine, Flonase, rosuvastatin, Shingrix, vitamin D  Changed: baclofen     Medication Affordability:       Allergies reviewed with patient and updates made in EHR: yes    Medication History Completed By: Silverio Mejia [facility] 8/29/2023 12:27 PM    Prior to Admission medications    Medication Sig Last Dose Taking? Auth Provider Long Term End Date   baclofen (LIORESAL) 20 MG tablet Take 1 tablet (20 mg) by mouth 2 times daily  Patient taking differently: Take 10 mg by mouth At Bedtime 8/27/2023 at hs Yes Samantha Humphrey MD     dalfampridine (AMPYRA) 10 MG TB12 12 hr tablet Take 1 tablet (10 mg) by mouth 2 times daily 8/28/2023 at pm Yes Samantha Humphrey MD     modafinil (PROVIGIL) 100 MG tablet Take 1-2 tablets (100-200 mg) by mouth daily as needed (ms related fatigue) Past Month Yes Samantha Humphrey MD     sertraline (ZOLOFT) 100 MG tablet TAKE 1 TABLET BY MOUTH  DAILY 8/28/2023 at am Yes Samantha Humphrey MD Yes

## 2023-08-29 NOTE — ED PROVIDER NOTES
"EMERGENCY DEPARTMENT ENCOUNTER      NAME: Ruthie Aaron  AGE: 68 year old female  YOB: 1955  MRN: [id number]  EVALUATION DATE & TIME: No admission date for patient encounter.    PCP: Soni Gil    ED PROVIDER: James Mosquera M.D.      Chief Complaint   Patient presents with    Altered Mental Status         IMPRESSION  1. Somnolence    2. Multiple sclerosis (H)        PLAN  - admit to hospitalist for further care; neuro tele obs    ED COURSE & MEDICAL DECISION MAKING    ED Course as of 08/29/23 1339   Tue Aug 29, 2023   1131 68yoF with history of MS (prescribed Ampyra, baclofen, modafinil) presenting with her  from home for evaluation of fatigue & decreased responsiveness.  reports that patient ran out of her modafinil 2 weeks ago and has not had this medication since. 2 days ago had increased fatigue & weakness and slept until 3pm; was able to wake up in the evening and get around with her  at home. Yesterday, patient very tired as well and  able to \"wake her up by walking around the house\" in time to get to the hospital for her previously-prescribed MRI brain/c-spine for her MS. This morning, patient very tired again with eye closed but did not \"wake up like the other days\" despite walking around at home with her  or taking a cold shower. Patient not talking to her  but able to walk and follow some commands.  states she has never had anything like this before.  notes he has been decreasing her baclofen dose the past couple days to see if this was causing her fatigue; no baclofen since yesterday morning. Denies any cough, fevers, sweats, chills, vomiting, diarrhea, rash, sick contacts, any other problems or complaints at this time.    Normal vitals on presentation. Patient very somnolent on exam with eyes closed, will make faint moaning sounds but not making words, does follow commands and moves all limbs with mild diffuse nonfocal " weakness, no tremor, PERRL @ 2mm (after I open her eye for her), clear lungs, normal work of breathing, benign abdomen, no peripheral edema, no rash.    Patient protecting her airway; doubt benefit to emergent intubation at this time. Suspect MS flare; perhaps simple missing her previously-prescribed modafinil. Just had MRI brain/c-spine done yesterday during her symptoms so will see what this shows. Will obtain screening EKG, blood, urine here now as well. Will give previously-prescribed modafinil while continuing to monitor.   1131 May require IV steroids & admission for MS flare (or other process) if wakefulness not improved. Patient's  comfortable with this plan; no further questions at this time.   1244 MRI cervical spine (from 8/28/23) independently reviewed & interpreted by me: no obvious cord signal enhancement or fluid collection.   1245 MRI brain (from 8/28/23) independently reviewed & interpreted by me: mild periventricular white matter enhancement with no lateralizing lesion, no obvious mass, no ICH, no suggestion of abscess.   1255 ED RN feels patient is too sleepy to swallow the modafinil; exam unchanged at this time. Eyes closed and will follows commands & moves all limbs. Paging neurology at this time.    Labs overall reassuring with CRP within normal limits, negative procal, no leukocytosis, no anemia, no ARIADNA, no glaring electrolyte abnormality, normal LFTs. TSH pending at this time and urine not yet obtained. Will add COVID/influenza/RSV swab to workup here now as well.   1313 Discussed with Neurology (Dr. Avina) who suspects MS flare; does not see anything particularly concerning on MRI at this time (not yet read per radiology). Recommends 1g Solu-Medrol and admission to the hospitalist. No neuro beds available elsewhere in system. Paging hospitalist here at [facility] for admission.   1334 Consulted hospitalist for admission; they agreed. Patient's  understood and agreed with the  plan; no further questions at the time of admission.    UA & viral swab pending at time of admission.       --------------------------------------------------------------------------------   --------------------------------------------------------------------------------     11:41 PM I met with the patient for the initial interview and physical examination. Discussed plan for treatment and workup in the ED.  11:50 AM I contacted the MRI tech to tell radiology to have the MRI form yesterday read stat.   1:11 PM I spoke with Dr. Avina, neurology, about the patient who recommends a 1000 mg of solu-medrol and admission to the hospital.   1:30 PM I discussed the case with hospitalist, Dr Barth, who accepts the patient.        This patient involved a high degree of complexity in medical decision making, as significant risks were present and assessed. Recent encounters & results in medical record reviewed by me.    All workup (i.e. any EKG/labs/imaging as per charting below) reviewed and independently interpreted by me. See respective sections for details.    Broad differential considered for this patient, including but not limited to:  MS flare, meningitis, encephalitis, drug overdose, UTI, pyelo, sepsis, bacteremia, hypothyroidism, ARIADNA, electrolyte derangement, viral syndrome, stroke      See additional MDM below if interested.      MEDICATIONS GIVEN IN THE EMERGENCY DEPARTMENT  Medications   modafinil (PROVIGIL) tablet 200 mg (has no administration in time range)   methylPREDNISolone sodium succinate (solu-MEDROL) injection 1,000 mg (has no administration in time range)                 =================================================================      HPI  Patient information was obtained from: patient's     Use of : N/A         Ruhtie Aaron is a 68 year old female with a pertinent history of MS, cerebellar ataxia who presents to this ED via personal vehicle with for evaluation of altered  mental status.    Patient's  reports the patient has had decreased activity for the past ~2 days. He notes 2 days ago the patient slept until 3 PM. Yesterday morning she wasn't responsive, but she became responsive in the afternoon. She had an MRI yesterday. This morning she was not responsive so he put her in the shower, tried to walk her, gave her a cold shower, and put ice cubes on her and she did not wake up or respond. She would just stand there with her eyes closed. He has decreased the dose of her baclofen given her fatigue over the past two days, no dose was given today.     Patient's  states they ran out of modafinil ~10 days ago and have contacted her doctor to get it refilled, but have not been able to be seen and get it filled.     He denies use of blood thinners, steroids. Denies vomiting, diarrhea, cough, fever, or additional symptoms or complaints at this time.         --------------- MEDICAL HISTORY ---------------  PAST MEDICAL HISTORY:  Reviewed independently by me.  History reviewed. No pertinent past medical history.  Patient Active Problem List   Diagnosis    Foot drop    Late-onset cerebellar ataxia (H)    Relapsing remitting multiple sclerosis (H)    Somnolence    Benign neoplasm of cecum    Benign neoplasm of sigmoid colon    Hemorrhoids    History of colonic polyps    Polyp of colon    Seasonal allergies    Multiple sclerosis (H)       PAST SURGICAL HISTORY:  Reviewed independently by me.  History reviewed. No pertinent surgical history.    CURRENT MEDICATIONS:    Reviewed independently by me.    Current Facility-Administered Medications:     methylPREDNISolone sodium succinate (solu-MEDROL) injection 1,000 mg, 1,000 mg, Intravenous, Once, James Mosquera MD    modafinil (PROVIGIL) tablet 200 mg, 200 mg, Oral, Once, James Mosquera MD    Current Outpatient Medications:     baclofen (LIORESAL) 20 MG tablet, Take 1 tablet (20 mg) by mouth 2 times daily (Patient taking  differently: Take 10 mg by mouth At Bedtime), Disp: 180 tablet, Rfl: 1    dalfampridine (AMPYRA) 10 MG TB12 12 hr tablet, Take 1 tablet (10 mg) by mouth 2 times daily, Disp: 60 tablet, Rfl: 5    modafinil (PROVIGIL) 100 MG tablet, Take 1-2 tablets (100-200 mg) by mouth daily as needed (ms related fatigue), Disp: 60 tablet, Rfl: 5    sertraline (ZOLOFT) 100 MG tablet, TAKE 1 TABLET BY MOUTH  DAILY, Disp: 90 tablet, Rfl: 3    ALLERGIES:  Reviewed independently by me.  Allergies   Allergen Reactions    Sulfacetamide      Other reaction(s): hives       FAMILY HISTORY:  Reviewed independently by me.  Family History   Problem Relation Age of Onset    Arthritis Mother     Multiple Sclerosis Brother        SOCIAL HISTORY:   Reviewed independently by me.  Social History     Socioeconomic History    Marital status:      Spouse name: None    Number of children: None    Years of education: None    Highest education level: None   Tobacco Use    Smoking status: Former    Smokeless tobacco: Never   Substance and Sexual Activity    Alcohol use: Not Currently       --------------- PHYSICAL EXAM ---------------  Nursing notes and vitals independently reviewed by me.  VITALS:  Vitals:    08/29/23 1136   BP: 136/66   Pulse: 77   Resp: 18   Temp: 98  F (36.7  C)   TempSrc: Tympanic   SpO2: 96%   Weight: 65.8 kg (145 lb)       PHYSICAL EXAM:    General:  somnolent during exam in no distress, follows commands  Eyes:  does not open eyes during exam, conjunctivae clear, conjugate gaze, PERRL @ 2mm  HENT:  atraumatic, nose with no rhinorrhea, oropharynx clear  Neck:  no meningismus  Cardiovascular:  HR 70s during exam, regular rhythm, no murmurs, brisk cap refill  Chest:  no chest wall tenderness  Pulmonary:  no stridor, normal phonation, normal work of breathing, clear lungs bilaterally  Abdomen:  soft, nondistended, nontender  :  no CVA tenderness  Back:  no midline spinal tenderness  Musculoskeletal:  no pretibial edema, no calf  tenderness. Gross ROM intact to joints of extremities with no obvious deformities.  Skin:  warm, dry, no rash  Neuro:  somnolent, does not open eyes to command, moans in response to some questions but does not say words, moves all limbs & follows commands with diffuse 4/5 strength, no tremor, no ankle clonus, GCS 9 (E1, V2, M6)  Psych:  calm, normal affect      --------------- RESULTS ---------------  EKG:    Reviewed and independently interpreted by me.  - NSR at 74bpm, no ST changes, small symmetric TWI in V1, normal intervals  - no priors for comparison  My read.    LAB:  Reviewed and independently interpreted by me.  Results for orders placed or performed during the hospital encounter of 08/29/23   Basic metabolic panel   Result Value Ref Range    Sodium 139 136 - 145 mmol/L    Potassium 4.8 3.4 - 5.3 mmol/L    Chloride 103 98 - 107 mmol/L    Carbon Dioxide (CO2) 24 22 - 29 mmol/L    Anion Gap 12 7 - 15 mmol/L    Urea Nitrogen 13.6 8.0 - 23.0 mg/dL    Creatinine 0.59 0.51 - 0.95 mg/dL    Calcium 9.2 8.8 - 10.2 mg/dL    Glucose 118 (H) 70 - 99 mg/dL    GFR Estimate >90 >60 mL/min/1.73m2   Hepatic function panel   Result Value Ref Range    Protein Total 7.4 6.4 - 8.3 g/dL    Albumin 4.2 3.5 - 5.2 g/dL    Bilirubin Total 0.5 <=1.2 mg/dL    Alkaline Phosphatase 94 35 - 104 U/L    AST 39 0 - 45 U/L    ALT 14 0 - 50 U/L    Bilirubin Direct <0.20 0.00 - 0.30 mg/dL   Result Value Ref Range    Lipase 37 13 - 60 U/L   Result Value Ref Range    Magnesium 2.1 1.7 - 2.3 mg/dL   TSH with free T4 reflex   Result Value Ref Range    TSH 4.11 0.30 - 4.20 uIU/mL   Result Value Ref Range    CRP Inflammation <3.00 <5.00 mg/L   Result Value Ref Range    Procalcitonin <0.02 <0.05 ng/mL   Glucose by meter   Result Value Ref Range    GLUCOSE BY METER POCT 111 (H) 70 - 99 mg/dL   CBC with platelets and differential   Result Value Ref Range    WBC Count 8.3 4.0 - 11.0 10e3/uL    RBC Count 5.07 3.80 - 5.20 10e6/uL    Hemoglobin 14.9 11.7 -  15.7 g/dL    Hematocrit 44.1 35.0 - 47.0 %    MCV 87 78 - 100 fL    MCH 29.4 26.5 - 33.0 pg    MCHC 33.8 31.5 - 36.5 g/dL    RDW 13.2 10.0 - 15.0 %    Platelet Count 259 150 - 450 10e3/uL    % Neutrophils 78 %    % Lymphocytes 16 %    % Monocytes 6 %    % Eosinophils 0 %    % Basophils 0 %    % Immature Granulocytes 0 %    NRBCs per 100 WBC 0 <1 /100    Absolute Neutrophils 6.5 1.6 - 8.3 10e3/uL    Absolute Lymphocytes 1.3 0.8 - 5.3 10e3/uL    Absolute Monocytes 0.5 0.0 - 1.3 10e3/uL    Absolute Eosinophils 0.0 0.0 - 0.7 10e3/uL    Absolute Basophils 0.0 0.0 - 0.2 10e3/uL    Absolute Immature Granulocytes 0.0 <=0.4 10e3/uL    Absolute NRBCs 0.0 10e3/uL   Extra Blue Top Tube   Result Value Ref Range    Hold Specimen JIC    Extra Red Top Tube   Result Value Ref Range    Hold Specimen JIC        RADIOLOGY:  Reviewed and independently interpreted by me. Please see official radiology report.  Recent Results (from the past 24 hour(s))   MR Brain w/o & w Contrast    Narrative    EXAM: MR BRAIN W/O and W CONTRAST, MR CERVICAL SPINE W/O and W CONTRAST  LOCATION: [facility]  DATE: 8/28/2023    INDICATION: Multiple sclerosis follow-up.  COMPARISON: 09/07/2022 head and cervical spine MRI.  CONTRAST: 6.5 mL IV Gadavist.  TECHNIQUE:   1) Routine multiplanar multisequence head MRI without and with intravenous contrast.  2) Routine Cervical Spine MRI without and with IV contrast.    FINDINGS:  HEAD MRI:  INTRACRANIAL CONTENTS: No finding for acute infarct, intracranial hemorrhage, or abnormal parenchymal enhancement. Moderate to marked burden white matter FLAIR hyperintensity with a periventricular predominance appears overall unchanged, and is again   compatible with chronic demyelination given the patient's known diagnosis of multiple sclerosis. A component of superimposed chronic small vessel ischemic change is likely possible. No new white matter FLAIR hyperintense lesion and no abnormal   enhancement  to suggest active demyelination. Mild to moderate diffuse parenchymal volume loss. Ventricular size is in keeping with this volume loss. Major intracranial vascular flow voids are preserved. Cerebellar tonsils are normally positioned.    SELLA: Partially empty sella morphology.    OSSEOUS STRUCTURES/SOFT TISSUES: Normal marrow signal.    ORBITS: No abnormality accounting for technique.     SINUSES/MASTOIDS: No paranasal sinus mucosal disease. No middle ear or mastoid effusion.     CERVICAL SPINE:   Mildly exaggerated cervical lordosis. Leftward curvature to the lower cervical and visualized upper thoracic spine. Unchanged vertebral body heights with multilevel mild endplate degenerative irregularity. Osseous hemangioma in the C2 vertebral body.   Additional osseous hemangioma in the C3 vertebral body. No osseous edema or concerning marrow signal abnormality. T2 hyperintense presumed chronic demyelinating plaque in the right lateral aspect of the cord extending from C3 to C4, and in the left   lateral aspect of the cord extending from C4 to C5 is unchanged. T2 hyperintense presumed chronic demyelinating plaque in the right lateral aspect of the cord at the C7-T1 level is unchanged. Partially visualized upper thoracic spinal cord lesions   appears similar to previous. No abnormal enhancement to suggest active demyelination. No extraspinal abnormality.    Craniovertebral junction and C1-C2: Moderate atlantodental degenerative change.    C2-C3: Normal disc height. Tiny posterior disc osteophyte complex. Mild bilateral facet arthropathy. Mild ligamentum flavum flavum hypertrophy. Mild spinal canal stenosis without significant neural foraminal stenosis.     C3-C4: Normal disc height. Tiny posterior disc osteophyte complex. Mild bilateral facet arthropathy. Mild ligamentum flavum flavum hypertrophy. Uncinate spurring bilaterally. Mild spinal canal stenosis with mild right and mild to moderate left neural   foraminal  stenosis.     C4-C5: Mild to moderate loss of disc height. Small posterior disc osteophyte complex. Mild bilateral facet arthropathy. Mild ligamentum flavum hypertrophy. Moderate spinal canal stenosis. Uncinate spurring bilaterally. Mild to moderate right and moderate   to severe left neural foraminal stenosis.     C5-C6: Mild loss of disc height. Small posterior disc osteophyte complex. Mild bilateral facet arthropathy. Moderate midline spinal canal stenosis with mild to moderate right and moderate to severe left neural foraminal stenosis.     C6-C7: Mild loss of disc height. Small posterior disc osteophyte complex. Mild bilateral facet arthropathy. Uncinate spurring bilaterally. Mild spinal canal stenosis with moderate to severe right and mild left neural foraminal stenosis.    C7-T1: Normal disc height. No posterior disc osteophyte complex. Mild bilateral facet arthropathy. Spinal canal and neural foramina are widely patent.      Impression    IMPRESSION:  HEAD MRI:   1.  No acute intracranial abnormality.    2.  Remainder FLAIR hyperintensity compatible with chronic demyelination appears overall unchanged. No new white matter FLAIR hyperintense lesions and no abnormal enhancement to suggest active demyelination.    CERVICAL SPINE MRI:  1.  Scattered T2/STIR hyperintense chronic demyelinating plaques in the cervical spinal cord are unchanged in size, number, and overall appearance. No abnormal enhancement to suggest active demyelination.    2.  Multilevel degenerative change, as above, with spinal canal and neural foraminal stenosis described in a level by level analysis above.   MR Cervical Spine w/o & w Contrast    Narrative    EXAM: MR BRAIN W/O and W CONTRAST, MR CERVICAL SPINE W/O and W CONTRAST  LOCATION: [facility]  DATE: 8/28/2023    INDICATION: Multiple sclerosis follow-up.  COMPARISON: 09/07/2022 head and cervical spine MRI.  CONTRAST: 6.5 mL IV Gadavist.  TECHNIQUE:   1)  Routine multiplanar multisequence head MRI without and with intravenous contrast.  2) Routine Cervical Spine MRI without and with IV contrast.    FINDINGS:  HEAD MRI:  INTRACRANIAL CONTENTS: No finding for acute infarct, intracranial hemorrhage, or abnormal parenchymal enhancement. Moderate to marked burden white matter FLAIR hyperintensity with a periventricular predominance appears overall unchanged, and is again   compatible with chronic demyelination given the patient's known diagnosis of multiple sclerosis. A component of superimposed chronic small vessel ischemic change is likely possible. No new white matter FLAIR hyperintense lesion and no abnormal   enhancement to suggest active demyelination. Mild to moderate diffuse parenchymal volume loss. Ventricular size is in keeping with this volume loss. Major intracranial vascular flow voids are preserved. Cerebellar tonsils are normally positioned.    SELLA: Partially empty sella morphology.    OSSEOUS STRUCTURES/SOFT TISSUES: Normal marrow signal.    ORBITS: No abnormality accounting for technique.     SINUSES/MASTOIDS: No paranasal sinus mucosal disease. No middle ear or mastoid effusion.     CERVICAL SPINE:   Mildly exaggerated cervical lordosis. Leftward curvature to the lower cervical and visualized upper thoracic spine. Unchanged vertebral body heights with multilevel mild endplate degenerative irregularity. Osseous hemangioma in the C2 vertebral body.   Additional osseous hemangioma in the C3 vertebral body. No osseous edema or concerning marrow signal abnormality. T2 hyperintense presumed chronic demyelinating plaque in the right lateral aspect of the cord extending from C3 to C4, and in the left   lateral aspect of the cord extending from C4 to C5 is unchanged. T2 hyperintense presumed chronic demyelinating plaque in the right lateral aspect of the cord at the C7-T1 level is unchanged. Partially visualized upper thoracic spinal cord lesions   appears  similar to previous. No abnormal enhancement to suggest active demyelination. No extraspinal abnormality.    Craniovertebral junction and C1-C2: Moderate atlantodental degenerative change.    C2-C3: Normal disc height. Tiny posterior disc osteophyte complex. Mild bilateral facet arthropathy. Mild ligamentum flavum flavum hypertrophy. Mild spinal canal stenosis without significant neural foraminal stenosis.     C3-C4: Normal disc height. Tiny posterior disc osteophyte complex. Mild bilateral facet arthropathy. Mild ligamentum flavum flavum hypertrophy. Uncinate spurring bilaterally. Mild spinal canal stenosis with mild right and mild to moderate left neural   foraminal stenosis.     C4-C5: Mild to moderate loss of disc height. Small posterior disc osteophyte complex. Mild bilateral facet arthropathy. Mild ligamentum flavum hypertrophy. Moderate spinal canal stenosis. Uncinate spurring bilaterally. Mild to moderate right and moderate   to severe left neural foraminal stenosis.     C5-C6: Mild loss of disc height. Small posterior disc osteophyte complex. Mild bilateral facet arthropathy. Moderate midline spinal canal stenosis with mild to moderate right and moderate to severe left neural foraminal stenosis.     C6-C7: Mild loss of disc height. Small posterior disc osteophyte complex. Mild bilateral facet arthropathy. Uncinate spurring bilaterally. Mild spinal canal stenosis with moderate to severe right and mild left neural foraminal stenosis.    C7-T1: Normal disc height. No posterior disc osteophyte complex. Mild bilateral facet arthropathy. Spinal canal and neural foramina are widely patent.      Impression    IMPRESSION:  HEAD MRI:   1.  No acute intracranial abnormality.    2.  Remainder FLAIR hyperintensity compatible with chronic demyelination appears overall unchanged. No new white matter FLAIR hyperintense lesions and no abnormal enhancement to suggest active demyelination.    CERVICAL SPINE MRI:  1.   Scattered T2/STIR hyperintense chronic demyelinating plaques in the cervical spinal cord are unchanged in size, number, and overall appearance. No abnormal enhancement to suggest active demyelination.    2.  Multilevel degenerative change, as above, with spinal canal and neural foraminal stenosis described in a level by level analysis above.       PROCEDURES:   Procedures   --------------------------------------------------------------------------------   Cardiac telemetry monitoring ordered, reviewed, and independently interpreted by me while patient was in the Emergency Department. Revealed no acute abnormalities.  --------------------------------------------------------------------------------                     --------------- ADDITIONAL MDM ---------------  History:  - I considered systemic symptoms of the presenting illness.  - Supplemental history from:       -- see above charting, if applicable: family ()  - External Record(s) reviewed:       -- see above charting, if applicable       -- Inpatient/outpatient record, prior labs, prior imaging    Workup:  - Chart documentation above includes differential considered and any EKGs or imaging independently interpreted by provider.  - In additional to work up documented, I considered the following work up:       -- see above charting, if applicable    External Consultation:  - Discussion of management with another provider:       -- see above charting, if applicable    Complicating Factors:  - Care impacted by chronic illness:       -- see above MDM, past medical history, & problem list  - Care affected by social determinants of health:       -- see above social history       -- Access to Affordable Healthcare    Disposition Considerations:  - Admit         I, Makenzie Estevez, am serving as a scribe to document services personally performed by Dr. James Mosquera based on my observation and the provider's statements to me. I, James Mosquera MD attest that  Makenzie Estevez is acting in a scribe capacity, has observed my performance of the services and has documented them in accordance with my direction.      James Mosquera MD  08/29/23  Emergency Medicine  [facility] EMERGENCY DEPARTMENT  [address] [place] [id number]  [phone]  Dept: [phone]     James Mosquera MD  08/29/23 3709       James Mosquera MD  08/29/23 2968

## 2023-08-29 NOTE — ED TRIAGE NOTES
Patient arrives by private car with her  for evaluation of decreased level of consciousness x 2 days.   reports patient has MS and has become less responsive to him over the last 2 days. Patient is following most directions but does not open her eyes.   Provider called to triage for neuo- no stroke code   reports that she has not been getting her medication (that keeps her awake) for approx 2 weeks.

## 2023-08-29 NOTE — CONSULTS
Care Management Initial Consult    General Information  Assessment completed with: Patient, Spouse or significant other, patient, spouse Elgin  Type of CM/SW Visit: Initial Assessment    Primary Care Provider verified and updated as needed: Yes   Readmission within the last 30 days: no previous admission in last 30 days      Reason for Consult: discharge planning, utilization management concerns  Advance Care Planning: Advance Care Planning Reviewed: no concerns identified          Communication Assessment  Patient's communication style: spoken language (English or Bilingual)             Cognitive  Cognitive/Neuro/Behavioral: .WDL except, arousability, level of consciousness     Arousal Level: arouses to repeated stimulation           Speech: unable to speak    Living Environment:   People in home: spouse  patient, spouse Elgin  Current living Arrangements: house      Able to return to prior arrangements: yes       Family/Social Support:  Care provided by: self, spouse/significant other  Provides care for: no one  Marital Status:     Elgin       Description of Support System: Supportive, Involved    Support Assessment: Patient communicates needs well met, Adequate family and caregiver support, Adequate social supports    Current Resources:   Patient receiving home care services: No     Community Resources: Housekeeping/Chore Agency  Equipment currently used at home:    Supplies currently used at home: None    Employment/Financial:  Employment Status:          Financial Concerns:             Does the patient's insurance plan have a 3 day qualifying hospital stay waiver?  No    Lifestyle & Psychosocial Needs:  Social Determinants of Health     Tobacco Use: Medium Risk (8/29/2023)    Patient History     Smoking Tobacco Use: Former     Smokeless Tobacco Use: Never     Passive Exposure: Not on file   Alcohol Use: Unknown (10/27/2020)    AUDIT-C     Frequency of Alcohol Consumption: 2-3 times a week      Average Number of Drinks: Not asked     Frequency of Binge Drinking: Not asked   Financial Resource Strain: Not on file   Food Insecurity: Not on file   Transportation Needs: Not on file   Physical Activity: Not on file   Stress: Not on file   Social Connections: Not on file   Intimate Partner Violence: Not on file   Depression: Not at risk (3/22/2023)    PHQ-2     PHQ-2 Score: 0   Housing Stability: Not on file       Functional Status:  Prior to admission patient needed assistance:   Dependent ADLs:: Independent, Ambulation-cane  Dependent IADLs:: Cleaning, Cooking, Meal Preparation, Medication Management, Transportation  Assesssment of Functional Status: Not at  functional baseline    Mental Health Status:          Chemical Dependency Status:                Values/Beliefs:  Spiritual, Cultural Beliefs, Latter-day Practices, Values that affect care:                 Additional Information:  Lives with spouse Elgin in their home. Independent with ADLs, spouse assists with IADLs. Ambulates using a cane. CHIRINOS discussed. Likely no discharge needs. Spouse to transport home.    Conchis Wylie RN

## 2023-08-30 ENCOUNTER — APPOINTMENT (OUTPATIENT)
Dept: PHYSICAL THERAPY | Facility: HOSPITAL | Age: 68
End: 2023-08-30
Attending: STUDENT IN AN ORGANIZED HEALTH CARE EDUCATION/TRAINING PROGRAM
Payer: MEDICARE

## 2023-08-30 VITALS
DIASTOLIC BLOOD PRESSURE: 57 MMHG | WEIGHT: 145 LBS | BODY MASS INDEX: 27.4 KG/M2 | HEART RATE: 86 BPM | RESPIRATION RATE: 18 BRPM | TEMPERATURE: 98.2 F | SYSTOLIC BLOOD PRESSURE: 114 MMHG | OXYGEN SATURATION: 95 %

## 2023-08-30 LAB
ANION GAP SERPL CALCULATED.3IONS-SCNC: 15 MMOL/L (ref 7–15)
BACTERIA UR CULT: ABNORMAL
BASOPHILS # BLD AUTO: 0 10E3/UL (ref 0–0.2)
BASOPHILS NFR BLD AUTO: 0 %
BUN SERPL-MCNC: 10.8 MG/DL (ref 8–23)
CALCIUM SERPL-MCNC: 9.4 MG/DL (ref 8.8–10.2)
CHLORIDE SERPL-SCNC: 100 MMOL/L (ref 98–107)
CREAT SERPL-MCNC: 0.6 MG/DL (ref 0.51–0.95)
DEPRECATED HCO3 PLAS-SCNC: 23 MMOL/L (ref 22–29)
EOSINOPHIL # BLD AUTO: 0 10E3/UL (ref 0–0.7)
EOSINOPHIL NFR BLD AUTO: 0 %
ERYTHROCYTE [DISTWIDTH] IN BLOOD BY AUTOMATED COUNT: 12.8 % (ref 10–15)
GFR SERPL CREATININE-BSD FRML MDRD: >90 ML/MIN/1.73M2
GLUCOSE SERPL-MCNC: 223 MG/DL (ref 70–99)
HBA1C MFR BLD: 5.9 %
HCT VFR BLD AUTO: 45.8 % (ref 35–47)
HGB BLD-MCNC: 15 G/DL (ref 11.7–15.7)
IMM GRANULOCYTES # BLD: 0 10E3/UL
IMM GRANULOCYTES NFR BLD: 0 %
LYMPHOCYTES # BLD AUTO: 0.8 10E3/UL (ref 0.8–5.3)
LYMPHOCYTES NFR BLD AUTO: 10 %
MCH RBC QN AUTO: 29 PG (ref 26.5–33)
MCHC RBC AUTO-ENTMCNC: 32.8 G/DL (ref 31.5–36.5)
MCV RBC AUTO: 89 FL (ref 78–100)
MONOCYTES # BLD AUTO: 0.3 10E3/UL (ref 0–1.3)
MONOCYTES NFR BLD AUTO: 4 %
NEUTROPHILS # BLD AUTO: 6.8 10E3/UL (ref 1.6–8.3)
NEUTROPHILS NFR BLD AUTO: 86 %
NRBC # BLD AUTO: 0 10E3/UL
NRBC BLD AUTO-RTO: 0 /100
PLATELET # BLD AUTO: 309 10E3/UL (ref 150–450)
POTASSIUM SERPL-SCNC: 4 MMOL/L (ref 3.4–5.3)
RBC # BLD AUTO: 5.17 10E6/UL (ref 3.8–5.2)
SODIUM SERPL-SCNC: 138 MMOL/L (ref 136–145)
WBC # BLD AUTO: 7.9 10E3/UL (ref 4–11)

## 2023-08-30 PROCEDURE — 80048 BASIC METABOLIC PNL TOTAL CA: CPT | Performed by: STUDENT IN AN ORGANIZED HEALTH CARE EDUCATION/TRAINING PROGRAM

## 2023-08-30 PROCEDURE — G0378 HOSPITAL OBSERVATION PER HR: HCPCS

## 2023-08-30 PROCEDURE — 99239 HOSP IP/OBS DSCHRG MGMT >30: CPT | Performed by: STUDENT IN AN ORGANIZED HEALTH CARE EDUCATION/TRAINING PROGRAM

## 2023-08-30 PROCEDURE — 83036 HEMOGLOBIN GLYCOSYLATED A1C: CPT | Performed by: STUDENT IN AN ORGANIZED HEALTH CARE EDUCATION/TRAINING PROGRAM

## 2023-08-30 PROCEDURE — 85025 COMPLETE CBC W/AUTO DIFF WBC: CPT | Performed by: STUDENT IN AN ORGANIZED HEALTH CARE EDUCATION/TRAINING PROGRAM

## 2023-08-30 PROCEDURE — 36415 COLL VENOUS BLD VENIPUNCTURE: CPT | Performed by: STUDENT IN AN ORGANIZED HEALTH CARE EDUCATION/TRAINING PROGRAM

## 2023-08-30 PROCEDURE — 97161 PT EVAL LOW COMPLEX 20 MIN: CPT | Mod: GP

## 2023-08-30 PROCEDURE — 250N000013 HC RX MED GY IP 250 OP 250 PS 637: Performed by: STUDENT IN AN ORGANIZED HEALTH CARE EDUCATION/TRAINING PROGRAM

## 2023-08-30 PROCEDURE — 99214 OFFICE O/P EST MOD 30 MIN: CPT | Performed by: PSYCHIATRY & NEUROLOGY

## 2023-08-30 RX ORDER — MODAFINIL 100 MG/1
100 TABLET ORAL DAILY
Status: DISCONTINUED | OUTPATIENT
Start: 2023-08-30 | End: 2023-08-30 | Stop reason: HOSPADM

## 2023-08-30 RX ORDER — MODAFINIL 100 MG/1
100-200 TABLET ORAL DAILY PRN
Qty: 30 TABLET | Refills: 0 | Status: SHIPPED | OUTPATIENT
Start: 2023-08-30 | End: 2023-09-06

## 2023-08-30 RX ORDER — CEFPODOXIME PROXETIL 100 MG/1
100 TABLET, FILM COATED ORAL 2 TIMES DAILY
Qty: 5 TABLET | Refills: 0 | Status: SHIPPED | OUTPATIENT
Start: 2023-08-30 | End: 2023-10-01

## 2023-08-30 RX ADMIN — DALFAMPRIDINE 10 MG: 10 TABLET, FILM COATED, EXTENDED RELEASE ORAL at 09:15

## 2023-08-30 RX ADMIN — MODAFINIL 100 MG: 100 TABLET ORAL at 09:16

## 2023-08-30 RX ADMIN — SERTRALINE HYDROCHLORIDE 100 MG: 100 TABLET ORAL at 09:16

## 2023-08-30 ASSESSMENT — ACTIVITIES OF DAILY LIVING (ADL)
ADLS_ACUITY_SCORE: 37
ADLS_ACUITY_SCORE: 43
ADLS_ACUITY_SCORE: 37
ADLS_ACUITY_SCORE: 43
ADLS_ACUITY_SCORE: 40
ADLS_ACUITY_SCORE: 37
ADLS_ACUITY_SCORE: 40

## 2023-08-30 NOTE — PLAN OF CARE
Problem: Plan of Care - These are the overarching goals to be used throughout the patient stay.    Goal: Optimal Comfort and Wellbeing  Outcome: Progressing     A&O to name, month, place, and partially to situation. Some confusion at times. No new neuro changes. Able to use call light appropriately. VSS. NSR on tele.

## 2023-08-30 NOTE — PROGRESS NOTES
NEUROLOGY PROGRESS NOTE     ASSESSMENT & PLAN     Diagnosis: History of MS, somnolence, UTI     69 yo woman with known history of MS, no recent medication changes, presenting with episodes of somnolence in the setting of being off of modafinil for 2 weeks.     MRI brain and cervical spine are negative for new/enhancing lesions.  Initial infectious work-up unremarkable but UA is positive for infection. Management per Hospitalist.  Drastic improvement of exam. Back to baseline per .  Now seems less-likely an MS flare. Symptoms would be odd for this, so non-focal. No additional steroids for now. Received IV Solumedrol in ED and modafinil.  Possible Uthoff's in the setting of illness.  Continue PTA modafinil, Ampyra and sertraline.   mentions that they may need a supply of medications upon discharge until they see Ruthie's neurologist next week.  Hold baclofen for now.  Consider cognitive evaluation as outpatient. Will defer to treating Neurologist.   Neurology will sign off.  Please let us know if any additional questions arise while the patient is in the hospital.     Neurology Discharge Planning: Okay from neurology standpoint as long as she is back to baseline from an ambulatory standpoint    Patient Active Problem List    Diagnosis Date Noted    Seasonal allergies 08/29/2023     Priority: Medium    Multiple sclerosis (H) 08/29/2023     Priority: Medium    Foot drop 10/27/2020     Priority: Medium    Late-onset cerebellar ataxia (H) 10/27/2020     Priority: Medium    Relapsing remitting multiple sclerosis (H) 10/27/2020     Priority: Medium    Somnolence 10/27/2020     Priority: Medium    Benign neoplasm of cecum 02/23/2017     Priority: Medium    Benign neoplasm of sigmoid colon 02/23/2017     Priority: Medium    History of colonic polyps 02/23/2017     Priority: Medium    Hemorrhoids 02/21/2017     Priority: Medium    Polyp of colon 02/21/2017     Priority: Medium     Medical History  History reviewed.  No pertinent past medical history.     SUBJECTIVE     No acute events reported overnight.  Nursing note mentions Ruthie seems a little withdrawn.    Ruthie's  is at bedside again today.  She feels that she is back to normal and he agrees.  She says she had a little bit of trouble sleeping with all of the foreign noises.  Elgin asks about medications, she has been out of her sertraline as well as the modafinil and they will need a supply before going home.     OBJECTIVE     Vital signs in last 24 hours  Temp:  [98  F (36.7  C)-99  F (37.2  C)] 98.1  F (36.7  C)  Pulse:  [71-96] 77  Resp:  [16-22] 18  BP: (132-144)/(63-85) 143/80  SpO2:  [92 %-97 %] 94 %    Weight:   [unfilled]    Review of Systems   Pertinent items are noted in HPI.    General Physical Exam: Patient is alert and oriented x 3. Vital signs were reviewed and are documented in EMR.   Neurological Exam:  Mental status: Attentive, interactive though processing speed appears to be slow  Speech: Fluent  Cranial nerves:  2-12 intact  Motor: Strength is 5/5 in UEs, mild weakness in RLE (baseline), full strength on the Left  Sensory: Intact to light touch throughout  Coordination: Normal FNF, heel to shin on Left is normal, slower on Right  Gait: Deferred for safety     DIAGNOSTIC STUDIES     Pertinent Radiology   Radiology Results: No new imaging to review.    Pertinent Labs   Lab Results: personally reviewed.   Recent Results (from the past 24 hour(s))   Glucose by meter    Collection Time: 08/29/23 11:44 AM   Result Value Ref Range    GLUCOSE BY METER POCT 111 (H) 70 - 99 mg/dL   Basic metabolic panel    Collection Time: 08/29/23 12:03 PM   Result Value Ref Range    Sodium 139 136 - 145 mmol/L    Potassium 4.8 3.4 - 5.3 mmol/L    Chloride 103 98 - 107 mmol/L    Carbon Dioxide (CO2) 24 22 - 29 mmol/L    Anion Gap 12 7 - 15 mmol/L    Urea Nitrogen 13.6 8.0 - 23.0 mg/dL    Creatinine 0.59 0.51 - 0.95 mg/dL    Calcium 9.2 8.8 - 10.2 mg/dL    Glucose 118 (H)  70 - 99 mg/dL    GFR Estimate >90 >60 mL/min/1.73m2   Hepatic function panel    Collection Time: 08/29/23 12:03 PM   Result Value Ref Range    Protein Total 7.4 6.4 - 8.3 g/dL    Albumin 4.2 3.5 - 5.2 g/dL    Bilirubin Total 0.5 <=1.2 mg/dL    Alkaline Phosphatase 94 35 - 104 U/L    AST 39 0 - 45 U/L    ALT 14 0 - 50 U/L    Bilirubin Direct <0.20 0.00 - 0.30 mg/dL   Lipase    Collection Time: 08/29/23 12:03 PM   Result Value Ref Range    Lipase 37 13 - 60 U/L   Magnesium    Collection Time: 08/29/23 12:03 PM   Result Value Ref Range    Magnesium 2.1 1.7 - 2.3 mg/dL   TSH with free T4 reflex    Collection Time: 08/29/23 12:03 PM   Result Value Ref Range    TSH 4.11 0.30 - 4.20 uIU/mL   CRP inflammation    Collection Time: 08/29/23 12:03 PM   Result Value Ref Range    CRP Inflammation <3.00 <5.00 mg/L   Procalcitonin    Collection Time: 08/29/23 12:03 PM   Result Value Ref Range    Procalcitonin <0.02 <0.05 ng/mL   CBC with platelets and differential    Collection Time: 08/29/23 12:03 PM   Result Value Ref Range    WBC Count 8.3 4.0 - 11.0 10e3/uL    RBC Count 5.07 3.80 - 5.20 10e6/uL    Hemoglobin 14.9 11.7 - 15.7 g/dL    Hematocrit 44.1 35.0 - 47.0 %    MCV 87 78 - 100 fL    MCH 29.4 26.5 - 33.0 pg    MCHC 33.8 31.5 - 36.5 g/dL    RDW 13.2 10.0 - 15.0 %    Platelet Count 259 150 - 450 10e3/uL    % Neutrophils 78 %    % Lymphocytes 16 %    % Monocytes 6 %    % Eosinophils 0 %    % Basophils 0 %    % Immature Granulocytes 0 %    NRBCs per 100 WBC 0 <1 /100    Absolute Neutrophils 6.5 1.6 - 8.3 10e3/uL    Absolute Lymphocytes 1.3 0.8 - 5.3 10e3/uL    Absolute Monocytes 0.5 0.0 - 1.3 10e3/uL    Absolute Eosinophils 0.0 0.0 - 0.7 10e3/uL    Absolute Basophils 0.0 0.0 - 0.2 10e3/uL    Absolute Immature Granulocytes 0.0 <=0.4 10e3/uL    Absolute NRBCs 0.0 10e3/uL   Extra Blue Top Tube    Collection Time: 08/29/23 12:04 PM   Result Value Ref Range    Hold Specimen JIC    Extra Red Top Tube    Collection Time: 08/29/23 12:04  PM   Result Value Ref Range    Hold Specimen JIC    UA with Microscopic reflex to Culture    Collection Time: 08/29/23  1:27 PM    Specimen: Urine, Catheter   Result Value Ref Range    Color Urine Yellow Colorless, Straw, Light Yellow, Yellow    Appearance Urine Clear Clear    Glucose Urine Negative Negative mg/dL    Bilirubin Urine Negative Negative    Ketones Urine Negative Negative mg/dL    Specific Gravity Urine 1.028 1.001 - 1.030    Blood Urine Negative Negative    pH Urine 5.5 5.0 - 7.0    Protein Albumin Urine 10 (A) Negative mg/dL    Urobilinogen Urine <2.0 <2.0 mg/dL    Nitrite Urine Positive (A) Negative    Leukocyte Esterase Urine 75 Shyann/uL (A) Negative    Bacteria Urine Many (A) None Seen /HPF    Mucus Urine Present (A) None Seen /LPF    RBC Urine 0 <=2 /HPF    WBC Urine 4 <=5 /HPF   Symptomatic Influenza A/B, RSV, & SARS-CoV2 PCR (COVID-19) Nasopharyngeal    Collection Time: 08/29/23  1:30 PM    Specimen: Nasopharyngeal; Swab   Result Value Ref Range    Influenza A PCR Negative Negative    Influenza B PCR Negative Negative    RSV PCR Negative Negative    SARS CoV2 PCR Negative Negative         HOSPITAL MEDICATIONS      baclofen  10 mg Oral At Bedtime    cefTRIAXone  1 g Intravenous Q24H FEDERICO    dalfampridine  10 mg Oral BID    sertraline  100 mg Oral Daily        Total time spent for face to face visit, reviewing labs/imaging studies, counseling and coordination of care was: 35 Minutes. More than 50% of this time was spent on counseling and coordination of care.    Dragon software used in the dictation on this note.    Alida Avina MD  [facility] Neurology [facility] - [address], [address]  [place] [id number]

## 2023-08-30 NOTE — CARE PLAN
"PRIMARY DIAGNOSIS: \"GENERIC\" NURSING  OUTPATIENT/OBSERVATION GOALS TO BE MET BEFORE DISCHARGE:  ADLs back to baseline: No    Activity and level of assistance: Up with maximum assistance. Consider SW and/or PT evaluation.     Pain status: Pain free.    Return to near baseline physical activity: Yes     Discharge Planner Nurse   Safe discharge environment identified: No  Barriers to discharge: Yes       Entered by: Sylvia Gonzalez RN 08/30/2023 6:02 AM     Please review provider order for any additional goals.   Nurse to notify provider when observation goals have been met and patient is ready for discharge.  "

## 2023-08-30 NOTE — CARE PLAN
"PRIMARY DIAGNOSIS: \"GENERIC\" NURSING  OUTPATIENT/OBSERVATION GOALS TO BE MET BEFORE DISCHARGE:  ADLs back to baseline: No    Activity and level of assistance: Up with maximum assistance. Consider SW and/or PT evaluation.     Pain status: Pain free.    Return to near baseline physical activity: Yes     Discharge Planner Nurse   Safe discharge environment identified: No  Barriers to discharge: Yes       Entered by: Sylvia Gonzalez RN 08/30/2023 12:39 AM     Please review provider order for any additional goals.   Nurse to notify provider when observation goals have been met and patient is ready for discharge.  "

## 2023-08-30 NOTE — DISCHARGE SUMMARY
[facility]  Hospitalist Discharge Summary      Date of Admission:  8/29/2023  Date of Discharge:  8/30/2023  Discharging Provider: Diamond Barth MD  Discharge Service: Hospitalist Service    Discharge Diagnoses   Altered mental status  Urinary tract infection    Clinically Significant Risk Factors          Follow-ups Needed After Discharge   Follow-up Appointments     Follow-up and recommended labs and tests       Follow up with primary care provider (PCP), Soni Gil, within 5   days for hospital follow- up. PCP to follow up final urine culture. Follow   up with your neurologist as scheduled.            Unresulted Labs Ordered in the Past 30 Days of this Admission       Date and Time Order Name Status Description    8/29/2023  1:59 PM Urine Culture Preliminary         These results will be followed up by PCP    Discharge Disposition   Discharged to home  Condition at discharge: Stable    Hospital Course      Ruthie Aaron is a 68 year old female admitted on 8/29/2023. She has history of MS and presented with progressive fatigue over a couple of weeks and 2 episodes of altered mental status in the past couple of days.  Since yesterday, patient was not able to open her eyes despite has been trying to wake her up by walking around the house and giving her cold bath.  2 days ago similar episode occurred but eventually she was able to wake up after shower.  Per her , she ran out of of modafinil for a couple of weeks and he also tried to decrease the dose of baclofen hoping to improve her mentation.  She had MRI of the brain and cervical spine done yesterday which showed chronic changes with no new active lesions.  Otherwise there is no history of fever, chills, vomiting, abdominal pain, chest pain, shortness of breath. labs are unremarkable. During my examination, patient was awake and was able to answered my questions but slightly confused with dates and place. Neurology was  consulted in the ED and recommended to start her on IV Solu-Medrol.  Modafinil was resumed.  Patient will be admitted for altered mental status evaluation and management.    Altered mental status- resolved  History of MS- inconsistent with flare-up  - No new lesions on MRI brain/cervical spine  -Neurology consult appreciated  -Status post IV Solu-Medrol- discontinued  -Resume modafinil. Hold baclofen    UTI  -Likely contributing to confusion  -s/p ceftriaxone. Discharge on cefpodoxime  -Await urine culture results    Prediabetes  -Hba1: 5.9%- follow with PCP    Patient is clinically and hemodynamically stable for discharge to home. Patient does not want to wait for final urine culture and wants to go home today on empiric antibiotics. Patient is to resume her home medications except baclofen. Medication reconciliation was done and medications sent to patient's preferred pharmacy. She to follow with her PCP and neurologist as scheduled. Patient and  verbalized understanding and agreed to plan of care. All questions answered    Consultations This Hospital Stay   NEUROLOGY IP CONSULT  CARE MANAGEMENT / SOCIAL WORK IP CONSULT  PHYSICAL THERAPY ADULT IP CONSULT    Code Status   Full Code    Time Spent on this Encounter   I, Diamond Barth MD, personally saw the patient today and spent greater than 30 minutes discharging this patient.       Diamond Barth MD  [address] [address]  Phone: [phone]  Fax: [phone]  ______________________________________________________________________    Physical Exam   Vital Signs: Temp: 98.2  F (36.8  C) Temp src: Oral BP: 114/57 Pulse: 86   Resp: 18 SpO2: 95 % O2 Device: None (Room air)    Weight: 145 lbs 0 oz  GEN: Alert and oriented. Not in acute distress.  HEENT: Atraumatic, mucous membrane- moist and pink.  Chest: Bilateral air entry.  CVS: S1S2 regular.   Abdomen: Soft. Non-tender, non-distended. No  organomegaly. No guarding or rigidity. Bowel sounds active.   Extremities: No pedal edema.  CNS: No involuntary movements.  Skin: no cyanosis or clubbing.        Primary Care Physician   Soni Gil    Discharge Orders      Home Care Referral      Reason for your hospital stay    Altered mental status  Urinary tract infection     Follow-up and recommended labs and tests     Follow up with primary care provider (PCP), Soni Gil, within 5 days for hospital follow- up. PCP to follow up final urine culture. Follow up with your neurologist as scheduled.     Activity    Your activity upon discharge: activity as tolerated     Diet    Follow this diet upon discharge: Orders Placed This Encounter      Room Service      Combination Diet Moderate Consistent Carb (60 g CHO per Meal) Diet.       Significant Results and Procedures       Discharge Medications   Current Discharge Medication List        START taking these medications    Details   cefpodoxime (VANTIN) 100 MG tablet Take 1 tablet (100 mg) by mouth 2 times daily  Qty: 5 tablet, Refills: 0    Associated Diagnoses: Acute cystitis without hematuria           CONTINUE these medications which have CHANGED    Details   modafinil (PROVIGIL) 100 MG tablet Take 1-2 tablets (100-200 mg) by mouth daily as needed (ms related fatigue)  Qty: 30 tablet, Refills: 0    Associated Diagnoses: Chronic fatigue; MS (multiple sclerosis) (H)           CONTINUE these medications which have NOT CHANGED    Details   dalfampridine (AMPYRA) 10 MG TB12 12 hr tablet Take 1 tablet (10 mg) by mouth 2 times daily  Qty: 60 tablet, Refills: 5    Associated Diagnoses: MS (multiple sclerosis) (H); Paraparesis (H)      sertraline (ZOLOFT) 100 MG tablet TAKE 1 TABLET BY MOUTH  DAILY  Qty: 90 tablet, Refills: 3    Comments: Requesting 1 year supply  Associated Diagnoses: Relapsing remitting multiple sclerosis (H)           STOP taking these medications       baclofen (LIORESAL) 20 MG tablet  Comments:   Reason for Stopping:             Allergies   Allergies   Allergen Reactions    Sulfacetamide      Other reaction(s): hives

## 2023-08-30 NOTE — PLAN OF CARE
Physical Therapy Discharge Summary    Reason for therapy discharge:    Discharged to home.    Progress towards therapy goal(s). See goals on Care Plan in [facility] electronic health record for goal details.  Eval only    Therapy recommendation(s):    Per pt and , pt is back to baseline and they feel comfortable returning home with him to assist as prior to admit

## 2023-08-30 NOTE — CONSULTS
NEUROLOGY CONSULTATION NOTE     Ruthie Aaron,  1955, MRN [id number] Date: 2023     [facility]   Code status:  Full Code   PCP: Soni Gil, [phone]      ASSESSMENT & PLAN   Diagnosis code: History of MS, somnolence, UTI    69 yo woman with known history of MS, no recent medication changes, presenting with episodes of somnolence in the setting of being off of modafinil for 2 weeks.    MRI brain and cervical spine are negative for new/enhancing lesions.  Initial infectious work-up unremarkable but UA is positive for infection. Management per Hospitalist.  Drastic improvement of exam. Back to baseline per .  Now seems less-likely an MS flare. Symptoms would be odd for this, so non-focal. No additional steroids for now. Received IV Solumedrol in ED and modafinil. Exam subsequently much-improved. Possible Uthoff's in the setting of illness.  Continue PTA modafinil in the AM.  Hold baclofen for now.  Neurology will follow along.       Chief Complaint   Patient presents with    Altered Mental Status        HISTORY OF PRESENT ILLNESS     We have been requested by Dr. Mosquera to evaluate Ruthie Aaron who is a 68 year old female for somnolence, history of MS. She follows with Dr. Humphrey in MS clinic. Patient is on Ampyra, baclofen and modafinil. She has been out of her modafinil for a couple of weeks and has increased problems with responsiveness over recent days. She had scheduled updated imaging done yesterday. She was tired earlier in the day, but then her  Elgin was able to get her going and they even went out to dinner last night and she seemed normal.He held her baclofen last night so that she would be more awake today but it did not help. No prior episode of altered awareness/responsiveness otherwise. Elgin says she was able to walk, get into the cold shower but still did not return to normal so he brought her in.     On initial ED exam, patient appeared somnolent, keeping  eyes closed, making moaning sounds. Positive UA, Elgin says she has been off for about a week.       PAST MEDICAL & SURGICAL HISTORY     Medical History  History reviewed. No pertinent past medical history.  Surgical History  History reviewed. No pertinent surgical history.     SOCIAL HISTORY     Social History      FAMILY HISTORY     Reviewed, and family history includes Arthritis in her mother; Multiple Sclerosis in her brother.     ALLERGIES     Allergies   Allergen Reactions    Sulfacetamide      Other reaction(s): hives        REVIEW OF SYSTEMS     Review of systems not obtained due to patient factors.     HOME & HOSPITAL MEDICATIONS     Prior to Admission Medications  Medications Prior to Admission   Medication Sig Dispense Refill Last Dose    baclofen (LIORESAL) 20 MG tablet Take 1 tablet (20 mg) by mouth 2 times daily (Patient taking differently: Take 10 mg by mouth At Bedtime) 180 tablet 1 8/27/2023 at hs    dalfampridine (AMPYRA) 10 MG TB12 12 hr tablet Take 1 tablet (10 mg) by mouth 2 times daily 60 tablet 5 8/28/2023 at pm    modafinil (PROVIGIL) 100 MG tablet Take 1-2 tablets (100-200 mg) by mouth daily as needed (ms related fatigue) 60 tablet 5 Past Month    sertraline (ZOLOFT) 100 MG tablet TAKE 1 TABLET BY MOUTH  DAILY 90 tablet 3 8/28/2023 at am       Hospital Medications   baclofen  10 mg Oral At Bedtime    cefTRIAXone  1 g Intravenous Q24H FEDERICO    dalfampridine  10 mg Oral BID    sertraline  100 mg Oral Daily        PHYSICAL EXAM     Vital signs  Temp:  [98  F (36.7  C)-99  F (37.2  C)] 99  F (37.2  C)  Pulse:  [71-82] 82  Resp:  [16-22] 20  BP: (132-144)/(63-85) 144/85  SpO2:  [95 %-97 %] 97 %    Weight:   [unfilled]    General Physical Exam: Patient is alert and oriented x 3. Vital signs were reviewed and are documented in EMR.   Neurological Exam:  Mental status: Attentive, interactive though processing speed appears to be slow  Speech: Fluent  Cranial nerves:  2-12 intact  Motor: Strength is  5/5 in UEs, mild weakness in RLE (baseline), full strength on the Left  Sensory: Intact to light touch throughout  Coordination: Normal FNF, heel to shin on Left is normal, slower on Right  Gait: Deferred for safety     DIAGNOSTIC STUDIES     Pertinent Radiology   Radiology Results: Reviewed and discussed with Radiologist    MRI Brain/Cervical Spine  IMPRESSION:  HEAD MRI:   1.  No acute intracranial abnormality.     2.  Remainder FLAIR hyperintensity compatible with chronic demyelination appears overall unchanged. No new white matter FLAIR hyperintense lesions and no abnormal enhancement to suggest active demyelination.     CERVICAL SPINE MRI:  1.  Scattered T2/STIR hyperintense chronic demyelinating plaques in the cervical spinal cord are unchanged in size, number, and overall appearance. No abnormal enhancement to suggest active demyelination.     2.  Multilevel degenerative change, as above, with spinal canal and neural foraminal stenosis described in a level by level analysis above.    Pertinent Labs   Lab Results: personally reviewed.   Recent Results (from the past 24 hour(s))   Glucose by meter    Collection Time: 08/29/23 11:44 AM   Result Value Ref Range    GLUCOSE BY METER POCT 111 (H) 70 - 99 mg/dL   Basic metabolic panel    Collection Time: 08/29/23 12:03 PM   Result Value Ref Range    Sodium 139 136 - 145 mmol/L    Potassium 4.8 3.4 - 5.3 mmol/L    Chloride 103 98 - 107 mmol/L    Carbon Dioxide (CO2) 24 22 - 29 mmol/L    Anion Gap 12 7 - 15 mmol/L    Urea Nitrogen 13.6 8.0 - 23.0 mg/dL    Creatinine 0.59 0.51 - 0.95 mg/dL    Calcium 9.2 8.8 - 10.2 mg/dL    Glucose 118 (H) 70 - 99 mg/dL    GFR Estimate >90 >60 mL/min/1.73m2   Hepatic function panel    Collection Time: 08/29/23 12:03 PM   Result Value Ref Range    Protein Total 7.4 6.4 - 8.3 g/dL    Albumin 4.2 3.5 - 5.2 g/dL    Bilirubin Total 0.5 <=1.2 mg/dL    Alkaline Phosphatase 94 35 - 104 U/L    AST 39 0 - 45 U/L    ALT 14 0 - 50 U/L    Bilirubin  Direct <0.20 0.00 - 0.30 mg/dL   Lipase    Collection Time: 08/29/23 12:03 PM   Result Value Ref Range    Lipase 37 13 - 60 U/L   Magnesium    Collection Time: 08/29/23 12:03 PM   Result Value Ref Range    Magnesium 2.1 1.7 - 2.3 mg/dL   TSH with free T4 reflex    Collection Time: 08/29/23 12:03 PM   Result Value Ref Range    TSH 4.11 0.30 - 4.20 uIU/mL   CRP inflammation    Collection Time: 08/29/23 12:03 PM   Result Value Ref Range    CRP Inflammation <3.00 <5.00 mg/L   Procalcitonin    Collection Time: 08/29/23 12:03 PM   Result Value Ref Range    Procalcitonin <0.02 <0.05 ng/mL   CBC with platelets and differential    Collection Time: 08/29/23 12:03 PM   Result Value Ref Range    WBC Count 8.3 4.0 - 11.0 10e3/uL    RBC Count 5.07 3.80 - 5.20 10e6/uL    Hemoglobin 14.9 11.7 - 15.7 g/dL    Hematocrit 44.1 35.0 - 47.0 %    MCV 87 78 - 100 fL    MCH 29.4 26.5 - 33.0 pg    MCHC 33.8 31.5 - 36.5 g/dL    RDW 13.2 10.0 - 15.0 %    Platelet Count 259 150 - 450 10e3/uL    % Neutrophils 78 %    % Lymphocytes 16 %    % Monocytes 6 %    % Eosinophils 0 %    % Basophils 0 %    % Immature Granulocytes 0 %    NRBCs per 100 WBC 0 <1 /100    Absolute Neutrophils 6.5 1.6 - 8.3 10e3/uL    Absolute Lymphocytes 1.3 0.8 - 5.3 10e3/uL    Absolute Monocytes 0.5 0.0 - 1.3 10e3/uL    Absolute Eosinophils 0.0 0.0 - 0.7 10e3/uL    Absolute Basophils 0.0 0.0 - 0.2 10e3/uL    Absolute Immature Granulocytes 0.0 <=0.4 10e3/uL    Absolute NRBCs 0.0 10e3/uL   Extra Blue Top Tube    Collection Time: 08/29/23 12:04 PM   Result Value Ref Range    Hold Specimen JIC    Extra Red Top Tube    Collection Time: 08/29/23 12:04 PM   Result Value Ref Range    Hold Specimen JIC    UA with Microscopic reflex to Culture    Collection Time: 08/29/23  1:27 PM    Specimen: Urine, Catheter   Result Value Ref Range    Color Urine Yellow Colorless, Straw, Light Yellow, Yellow    Appearance Urine Clear Clear    Glucose Urine Negative Negative mg/dL    Bilirubin Urine  Negative Negative    Ketones Urine Negative Negative mg/dL    Specific Gravity Urine 1.028 1.001 - 1.030    Blood Urine Negative Negative    pH Urine 5.5 5.0 - 7.0    Protein Albumin Urine 10 (A) Negative mg/dL    Urobilinogen Urine <2.0 <2.0 mg/dL    Nitrite Urine Positive (A) Negative    Leukocyte Esterase Urine 75 Shyann/uL (A) Negative    Bacteria Urine Many (A) None Seen /HPF    Mucus Urine Present (A) None Seen /LPF    RBC Urine 0 <=2 /HPF    WBC Urine 4 <=5 /HPF   Symptomatic Influenza A/B, RSV, & SARS-CoV2 PCR (COVID-19) Nasopharyngeal    Collection Time: 08/29/23  1:30 PM    Specimen: Nasopharyngeal; Swab   Result Value Ref Range    Influenza A PCR Negative Negative    Influenza B PCR Negative Negative    RSV PCR Negative Negative    SARS CoV2 PCR Negative Negative       Total time spent for face to face visit, reviewing labs/imaging studies, counseling and coordination of care was: 1 Hour 15 Minutes More than 50% of this time was spent on counseling and coordination of care.    Dragon software used in the dictation of this note.    Alida Avina MD  [facility] - [address], [address]  [place] [address] [phone]

## 2023-08-30 NOTE — PLAN OF CARE
Problem: Plan of Care - These are the overarching goals to be used throughout the patient stay.    Goal: Plan of Care Review  Description: The Plan of Care Review/Shift note should be completed every shift.  The Outcome Evaluation is a brief statement about your assessment that the patient is improving, declining, or no change.  This information will be displayed automatically on your shift note.  Outcome: Progressing   Goal Outcome Evaluation:  Treatment for UTI, monitor for any altered mental episodes as at home. Will discuss a therapy order with doctor per families request.  Problem: Plan of Care - These are the overarching goals to be used throughout the patient stay.    Goal: Optimal Comfort and Wellbeing  Outcome: Progressing        Denies any pain. Moving self about in bed, ate well for breakfast.

## 2023-08-30 NOTE — PROGRESS NOTES
Care Management Discharge Note    Discharge Date: 08/30/2023       Discharge Disposition: Home Care    Discharge Services:      Discharge DME:      Discharge Transportation: family or friend will provide    Private pay costs discussed: Not applicable    Does the patient's insurance plan have a 3 day qualifying hospital stay waiver?  Yes   Will the waiver be used for post-acute placement? No    PAS Confirmation Code:    Patient/family educated on Medicare website which has current facility and service quality ratings:      Education Provided on the Discharge Plan:    Persons Notified of Discharge Plans: patient   Patient/Family in Agreement with the Plan:      Handoff Referral Completed: Yes    Additional Information:  Spoke to MD. Says he ordered home PT for patient per her request. Referral sent to [facility]     Anita Real RN

## 2023-08-30 NOTE — PLAN OF CARE
Problem: Plan of Care - These are the overarching goals to be used throughout the patient stay.    Goal: Absence of Hospital-Acquired Illness or Injury  Intervention: Identify and Manage Fall Risk  Recent Flowsheet Documentation  Taken 8/29/2023 2300 by Sylvia Gonzalez RN  Safety Promotion/Fall Prevention:   nonskid shoes/slippers when out of bed   room door open   clutter free environment maintained  Taken 8/29/2023 1945 by Sylvia Gonzalez RN  Safety Promotion/Fall Prevention:   nonskid shoes/slippers when out of bed   room door open   clutter free environment maintained  Intervention: Prevent Skin Injury  Recent Flowsheet Documentation  Taken 8/29/2023 1946 by Sylvia Gonzalez RN  Body Position: supine, head elevated   Goal Outcome Evaluation:  Ruthie oriented to self, she's aware we're in a hospital, she knows her birthday, stated the month, and vaguely able to explain why she is here. She appears a little withdrawn. Room air. NSR on tele. Denies pain. No significant events overnight. Appeared to sleep well. Q2 repositioning.

## 2023-08-30 NOTE — PROGRESS NOTES
08/30/23 0195   Appointment Info   Signing Clinician's Name / Credentials (PT) Cristy Philippe PT   Quick Adds   Quick Adds Certification   Living Environment   People in Home spouse   Current Living Arrangements house   Home Accessibility stairs to enter home   Number of Stairs, Main Entrance 3   Stair Railings, Main Entrance railings safe and in good condition   Transportation Anticipated family or friend will provide   Self-Care   Usual Activity Tolerance moderate   Current Activity Tolerance moderate   Equipment Currently Used at Home cane, straight   Activity/Exercise/Self-Care Comment pt/ state that he assists with walking as needed and with the stairs, other ADL's as needed   General Information   Onset of Illness/Injury or Date of Surgery 08/29/23   Referring Physician CONNIE Barth   Patient/Family Therapy Goals Statement (PT) return home   Pertinent History of Current Problem (include personal factors and/or comorbidities that impact the POC) 69 yo woman with known history of MS, no recent medication changes, presenting with episodes of somnolence in the setting of being off of modafinil for 2 weeks.   Existing Precautions/Restrictions fall   General Observations  present, both  and patient state that she is back to her baseline   Cognition   Affect/Mental Status (Cognition) WFL   Orientation Status (Cognition) oriented to;person;place;situation   Follows Commands (Cognition) WFL   Pain Assessment   Patient Currently in Pain No   Range of Motion (ROM)   ROM Comment BLE ROM grossly WFL   Strength (Manual Muscle Testing)   Strength Comments LE strength is WFL excluding drop foot on the R, coordination is impaired   Bed Mobility   Comment, (Bed Mobility) pt up in chair at start of session,  states she is not concerned with bed mobility   Transfers   Transfers sit-stand transfer   Sit-Stand Transfer   Sit-Stand [place] (Transfers) minimum assist (75% patient effort)   Assistive Device  (Sit-Stand Transfers) cane, straight   Comment, (Sit-Stand Transfer) assist with coming to a full stand and with initial standing balance   Gait/Stairs (Locomotion)   [place] (Gait) minimum assist (75% patient effort)   Assistive Device (Gait) cane, straight   Distance in Feet 150   Pattern (Gait) step-to;step-through   Deviations/Abnormal Patterns (Gait) base of support, wide;aster decreased;weight shifting decreased   Negotiation (Stairs) other (see comments)  (pt and  decline, stating they have a way of doing it at home with bilat rails that are close together and he assists with lifting her foot.)   Comment, (Gait/Stairs) 75' with PT, then 75' with  and PT giving SBA   Clinical Impression   Criteria for Skilled Therapeutic Intervention Evaluation only   PT Diagnosis (PT) impaired functional mobility   Influenced by the following impairments MS (weakness, coordination)   Functional limitations due to impairments transfers, gait   Clinical Presentation (PT Evaluation Complexity) Stable/Uncomplicated   Clinical Presentation Rationale presents as diagnosed   Clinical Decision Making (Complexity) low complexity   Anticipated Equipment Needs at Discharge (PT) cane, straight   Risk & Benefits of therapy have been explained evaluation/treatment results reviewed;patient;spouse/significant other   PT Total Evaluation Time   PT Eval, Low Complexity Minutes (08346) 15   PT Discharge Planning   PT Plan pt discharging home later today   PT Discharge Recommendation (DC Rec) home with assist   PT Rationale for DC Rec assist of  as prior to admit   PT Brief overview of current status transfers and gt with SE cane and min assist of 1   Total Session Time   Total Session Time (sum of timed and untimed services) 15

## 2023-09-01 LAB
ATRIAL RATE - MUSE: 74 BPM
DIASTOLIC BLOOD PRESSURE - MUSE: 88 MMHG
INTERPRETATION ECG - MUSE: NORMAL
P AXIS - MUSE: 49 DEGREES
PR INTERVAL - MUSE: 128 MS
QRS DURATION - MUSE: 76 MS
QT - MUSE: 418 MS
QTC - MUSE: 463 MS
R AXIS - MUSE: 27 DEGREES
SYSTOLIC BLOOD PRESSURE - MUSE: 180 MMHG
T AXIS - MUSE: 41 DEGREES
VENTRICULAR RATE- MUSE: 74 BPM

## 2023-09-06 ENCOUNTER — OFFICE VISIT (OUTPATIENT)
Dept: NEUROLOGY | Facility: CLINIC | Age: 68
End: 2023-09-06
Attending: PSYCHIATRY & NEUROLOGY
Payer: MEDICARE

## 2023-09-06 ENCOUNTER — LAB (OUTPATIENT)
Dept: LAB | Facility: CLINIC | Age: 68
End: 2023-09-06
Payer: MEDICARE

## 2023-09-06 VITALS
SYSTOLIC BLOOD PRESSURE: 101 MMHG | DIASTOLIC BLOOD PRESSURE: 69 MMHG | OXYGEN SATURATION: 93 % | HEART RATE: 82 BPM | WEIGHT: 134.1 LBS | BODY MASS INDEX: 25.34 KG/M2

## 2023-09-06 DIAGNOSIS — R41.3 MEMORY CHANGE: ICD-10-CM

## 2023-09-06 DIAGNOSIS — R53.82 CHRONIC FATIGUE: ICD-10-CM

## 2023-09-06 DIAGNOSIS — F32.89 OTHER DEPRESSION: ICD-10-CM

## 2023-09-06 DIAGNOSIS — G35 MS (MULTIPLE SCLEROSIS) (H): ICD-10-CM

## 2023-09-06 DIAGNOSIS — N30.00 ACUTE CYSTITIS WITHOUT HEMATURIA: ICD-10-CM

## 2023-09-06 DIAGNOSIS — R44.1 VISUAL HALLUCINATION: ICD-10-CM

## 2023-09-06 DIAGNOSIS — G35 MS (MULTIPLE SCLEROSIS) (H): Primary | ICD-10-CM

## 2023-09-06 DIAGNOSIS — R26.81 GAIT INSTABILITY: ICD-10-CM

## 2023-09-06 LAB
ALBUMIN UR-MCNC: NEGATIVE MG/DL
APPEARANCE UR: CLEAR
BILIRUB UR QL STRIP: NEGATIVE
COLOR UR AUTO: ABNORMAL
GLUCOSE UR STRIP-MCNC: NEGATIVE MG/DL
HGB UR QL STRIP: NEGATIVE
HYALINE CASTS: 1 /LPF
KETONES UR STRIP-MCNC: NEGATIVE MG/DL
LEUKOCYTE ESTERASE UR QL STRIP: NEGATIVE
MUCOUS THREADS #/AREA URNS LPF: PRESENT /LPF
NITRATE UR QL: NEGATIVE
PH UR STRIP: 5.5 [PH] (ref 5–7)
RBC URINE: <1 /HPF
SP GR UR STRIP: 1.02 (ref 1–1.03)
SQUAMOUS EPITHELIAL: <1 /HPF
TRANSITIONAL EPI: 1 /HPF
UROBILINOGEN UR STRIP-MCNC: NORMAL MG/DL
WBC URINE: 3 /HPF

## 2023-09-06 PROCEDURE — 81001 URINALYSIS AUTO W/SCOPE: CPT | Performed by: PATHOLOGY

## 2023-09-06 PROCEDURE — 99215 OFFICE O/P EST HI 40 MIN: CPT | Performed by: PSYCHIATRY & NEUROLOGY

## 2023-09-06 PROCEDURE — G0463 HOSPITAL OUTPT CLINIC VISIT: HCPCS | Performed by: PSYCHIATRY & NEUROLOGY

## 2023-09-06 RX ORDER — MODAFINIL 100 MG/1
100-200 TABLET ORAL DAILY PRN
Qty: 30 TABLET | Refills: 5 | Status: SHIPPED | OUTPATIENT
Start: 2023-09-06 | End: 2024-05-15

## 2023-09-06 ASSESSMENT — PATIENT HEALTH QUESTIONNAIRE - PHQ9: SUM OF ALL RESPONSES TO PHQ QUESTIONS 1-9: 25

## 2023-09-06 ASSESSMENT — PAIN SCALES - GENERAL: PAINLEVEL: MILD PAIN (2)

## 2023-09-06 NOTE — LETTER
9/6/2023       RE: Ruthie Aaron  [address] [facility] [address]       Dear Colleague,    Thank you for referring your patient, Ruthie Aaron, to the [facility] [place] [facility]. Please see a copy of my visit note below.    Date of Service: 9/6/2023    [facility] Neurology   MS Clinic Evaluation     Subjective: 68 year old woman with HLD, osteoporosis, and multiple sclerosis who presents in follow up for MS     She is accompanied by her  Gilmer and son Armando. Daughter in law  Natabimael accompanies by phone.     She recently was seen in the ER for a urinary tract infection.  Leading up to that she was experiencing visual hallucinations.  She describes the hallucinationsaliens dropping off people in the yard.  She notes that her memory is hazy.  On the day that she was brought to the ER she was very difficult to arouse.    She has a tendency to be very sleepy throughout the day.  She has difficulty keeping track of the date.    She has not experienced any tremor.  Balance is impaired.  Her  notes that she is swinging her right leg more when going upstairs.  Her gait has slowed down.    Family has been cautious to monitor her medication intake.  She has been tapered off of baclofen which has been helpful for her alertness.    She denies symptoms of REM behavior sleep disorder.    She is noted to alternate between good days and bad days.  On a good day she is better able to communicate her thoughts and have a conversation.  However on a bad day she has difficulty forming a sentence and will be slow to respond to questions.  On occasion she will even have difficulty keeping track of the month or identifying family members.    There is a positive family history of late onset dementia and 2 older siblings.    Disease onset: Age 45 when she experienced tripping over her right foot and spasms  Progression onset: Mid 50s,  manifesting with a right foot drop much more than the left    DMD history:   Avonex 0950-1776 flulike side effects, injection intolerance  Copaxone 8768-4288, discontinued due to lipoatrophy  Tecfidera 2017 for 2 months, discontinued due to flushing        Allergies   Allergen Reactions    Sulfacetamide      Other reaction(s): hives       Current Outpatient Medications   Medication    cefpodoxime (VANTIN) 100 MG tablet    dalfampridine (AMPYRA) 10 MG TB12 12 hr tablet    sertraline (ZOLOFT) 100 MG tablet    modafinil (PROVIGIL) 100 MG tablet     No current facility-administered medications for this visit.        Past medical, surgical, social and family history was personally reviewed. Pertinent details noted above.     Physical Examination:   /69 (BP Location: Left arm, Patient Position: Sitting, Cuff Size: Adult Regular)   Pulse 82   Wt 60.8 kg (134 lb 1.6 oz)   SpO2 93%   BMI 25.34 kg/m      General: no acute distress  Oriented to place, but not day/mo/year, she can follow simple commands but has difficulty with two step commands, naming intact  Cranial nerves:   VFFC  PERRL w/no RAPD  EOM full w/B/L GULSHAN   Face symmetric  Hearing intact  No dysarthria   Motor:   Tone is sl increased, slowed movements on left  Bulk is normal     R L  Deltoid  5 5  Biceps  5 5  Triceps 5 5  Wrist ext 5 5  Finger ext 5 5  Finger abd 5 5    Hip flexion 3 4+  Knee flexion 4 5  Knee ext 4 5  Ankle d/f 4 5-    Coordination: no ataxia or dysmetria  Gait: wide based hemiparetic gait with very slow stride RLE      Tests/Imaging:     D 41    MRI brain   2015 - mod sev lesion burden with predominantly pvl, but also few jcl and dwml, but no pf lesions  2022 - mild/mod growth of lesions, no distinct new lesions, none venkat enhancing  2023 - no change in lesions reviewed in clinic    MRI cervical spine   2022 - 3 cord lesions, lesion right lat cord at c3 faint gd+  2023 - no new lesions    MRI thoracic spine   2022 - few small lesions  noted, gd-       Assessment: 68 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  Updated imaging performed last month is negative for any new lesions or enhancing lesions.    I am concerned about the decline in memory.  Certainly the recent visual hallucinations and decreased responsiveness are due to infection induced delirium.  However it does seem that she is experiencing cognitive fluctuations.  This could be a sign of a secondary neurodegenerative disorder.  I have recommended neuropsychologic evaluation.  She was agreeable to a psychiatry referral to address mood concerns.    I do think that it is reasonable for her to return to physical therapy to improve her balance/gait.  However, I did share some concerns that it may be taxing on her to leave the home.  She will give a trial of returning to step therapy, but may benefit more from home therapy if this becomes too taxing.  There is some concern that social isolation may be contributing to her symptoms, which the clinic environment might be helpful for her.    I have ordered for her to have a repeat urinalysis.    Plan:     -Remain off baclofen  - Step physical therapy referral  - Neuropsychologic evaluation  - Psychiatry  - Follow-up in 4 months  - Continue current medications    Note was completed with the assistance of Dragon Fluency software which can often result in accidental word substitutions.     A total of 40 minutes on the date of service were spent in the care of this patient.   Samantha Humphrey MD on 9/6/2023 at 2:26 PM            Again, thank you for allowing me to participate in the care of your patient.      Sincerely,    Samantha Humphrey MD

## 2023-09-06 NOTE — PROGRESS NOTES
Date of Service: 9/6/2023    [facility] Neurology   MS Clinic Evaluation     Subjective: 68 year old woman with HLD, osteoporosis, and multiple sclerosis who presents in follow up for MS     She is accompanied by her  Gilmer and son Armando. Daughter in law  Agnieszka accompanies by phone.     She recently was seen in the ER for a urinary tract infection.  Leading up to that she was experiencing visual hallucinations.  She describes the hallucinationsaliens dropping off people in the yard.  She notes that her memory is hazy.  On the day that she was brought to the ER she was very difficult to arouse.    She has a tendency to be very sleepy throughout the day.  She has difficulty keeping track of the date.    She has not experienced any tremor.  Balance is impaired.  Her  notes that she is swinging her right leg more when going upstairs.  Her gait has slowed down.    Family has been cautious to monitor her medication intake.  She has been tapered off of baclofen which has been helpful for her alertness.    She denies symptoms of REM behavior sleep disorder.    She is noted to alternate between good days and bad days.  On a good day she is better able to communicate her thoughts and have a conversation.  However on a bad day she has difficulty forming a sentence and will be slow to respond to questions.  On occasion she will even have difficulty keeping track of the month or identifying family members.    There is a positive family history of late onset dementia and 2 older siblings.    Disease onset: Age 45 when she experienced tripping over her right foot and spasms  Progression onset: Mid 50s, manifesting with a right foot drop much more than the left    DMD history:   Avonex [id number] flulike side effects, injection intolerance  Copaxone [id number], discontinued due to lipoatrophy  Tecfidera 2017 for 2 months, discontinued due to flushing        Allergies   Allergen Reactions     Sulfacetamide      Other  reaction(s): hives       Current Outpatient Medications   Medication     cefpodoxime (VANTIN) 100 MG tablet     dalfampridine (AMPYRA) 10 MG TB12 12 hr tablet     sertraline (ZOLOFT) 100 MG tablet     modafinil (PROVIGIL) 100 MG tablet     No current facility-administered medications for this visit.        Past medical, surgical, social and family history was personally reviewed. Pertinent details noted above.     Physical Examination:   /69 (BP Location: Left arm, Patient Position: Sitting, Cuff Size: Adult Regular)   Pulse 82   Wt 60.8 kg (134 lb 1.6 oz)   SpO2 93%   BMI 25.34 kg/m      General: no acute distress  Oriented to place, but not day/mo/year, she can follow simple commands but has difficulty with two step commands, naming intact  Cranial nerves:   VFFC  PERRL w/no RAPD  EOM full w/B/L GULSHAN   Face symmetric  Hearing intact  No dysarthria   Motor:   Tone is sl increased, slowed movements on left  Bulk is normal     R L  Deltoid  5 5  Biceps  5 5  Triceps 5 5  Wrist ext 5 5  Finger ext 5 5  Finger abd 5 5    Hip flexion 3 4+  Knee flexion 4 5  Knee ext 4 5  Ankle d/f 4 5-    Coordination: no ataxia or dysmetria  Gait: wide based hemiparetic gait with very slow stride RLE      Tests/Imaging:     D 41    MRI brain   2015 - mod sev lesion burden with predominantly pvl, but also few jcl and dwml, but no pf lesions  2022 - mild/mod growth of lesions, no distinct new lesions, none venkat enhancing  2023 - no change in lesions reviewed in clinic    MRI cervical spine   2022 - 3 cord lesions, lesion right lat cord at c3 faint gd+  2023 - no new lesions    MRI thoracic spine   2022 - few small lesions noted, gd-       Assessment: 68 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  Updated imaging performed last month is negative for any new lesions or enhancing lesions.    I am concerned  about the decline in memory.  Certainly the recent visual hallucinations and decreased responsiveness are due to infection induced delirium.  However it does seem that she is experiencing cognitive fluctuations.  This could be a sign of a secondary neurodegenerative disorder.  I have recommended neuropsychologic evaluation.  She was agreeable to a psychiatry referral to address mood concerns.    I do think that it is reasonable for her to return to physical therapy to improve her balance/gait.  However, I did share some concerns that it may be taxing on her to leave the home.  She will give a trial of returning to step therapy, but may benefit more from home therapy if this becomes too taxing.  There is some concern that social isolation may be contributing to her symptoms, which the clinic environment might be helpful for her.    I have ordered for her to have a repeat urinalysis.    Plan:     -Remain off baclofen  - Step physical therapy referral  - Neuropsychologic evaluation  - Psychiatry  - Follow-up in 4 months  - Continue current medications    Note was completed with the assistance of Dragon Fluency software which can often result in accidental word substitutions.     A total of 40 minutes on the date of service were spent in the care of this patient.   Samantha Humphrey MD on 9/6/2023 at 2:26 PM

## 2023-09-06 NOTE — PATIENT INSTRUCTIONS
Ok to remain off baclofen     Urine test today     Step physical therapy     Schedule a memory evaluation     Visit with psychiatry     Follow up in 4 months

## 2023-09-06 NOTE — NURSING NOTE
Chief Complaint   Patient presents with    MS    RECHECK     6 month follow up      Vitals were taken and medications were reconciled.   Toby Magana, EMT  1:39 PM

## 2023-10-01 ENCOUNTER — APPOINTMENT (OUTPATIENT)
Dept: CT IMAGING | Facility: HOSPITAL | Age: 68
End: 2023-10-01
Attending: EMERGENCY MEDICINE
Payer: MEDICARE

## 2023-10-01 ENCOUNTER — HOSPITAL ENCOUNTER (EMERGENCY)
Facility: HOSPITAL | Age: 68
Discharge: HOME | End: 2023-10-01
Attending: EMERGENCY MEDICINE | Admitting: EMERGENCY MEDICINE
Payer: MEDICARE

## 2023-10-01 VITALS
RESPIRATION RATE: 18 BRPM | TEMPERATURE: 98.6 F | DIASTOLIC BLOOD PRESSURE: 75 MMHG | SYSTOLIC BLOOD PRESSURE: 113 MMHG | HEIGHT: 64 IN | BODY MASS INDEX: 23.05 KG/M2 | WEIGHT: 135 LBS | HEART RATE: 90 BPM | OXYGEN SATURATION: 96 %

## 2023-10-01 DIAGNOSIS — N30.00 ACUTE CYSTITIS WITHOUT HEMATURIA: ICD-10-CM

## 2023-10-01 LAB
ALBUMIN SERPL BCG-MCNC: 4.3 G/DL (ref 3.5–5.2)
ALBUMIN UR-MCNC: 10 MG/DL
ALP SERPL-CCNC: 96 U/L (ref 35–104)
ALT SERPL W P-5'-P-CCNC: 12 U/L (ref 0–50)
ANION GAP SERPL CALCULATED.3IONS-SCNC: 13 MMOL/L (ref 7–15)
APPEARANCE UR: ABNORMAL
AST SERPL W P-5'-P-CCNC: 22 U/L (ref 0–45)
BACTERIA #/AREA URNS HPF: ABNORMAL /HPF
BASOPHILS # BLD AUTO: 0 10E3/UL (ref 0–0.2)
BASOPHILS NFR BLD AUTO: 1 %
BILIRUB SERPL-MCNC: 0.2 MG/DL
BILIRUB UR QL STRIP: NEGATIVE
BUN SERPL-MCNC: 17.8 MG/DL (ref 8–23)
CALCIUM SERPL-MCNC: 9.6 MG/DL (ref 8.8–10.2)
CHLORIDE SERPL-SCNC: 107 MMOL/L (ref 98–107)
COLOR UR AUTO: ABNORMAL
CREAT SERPL-MCNC: 0.6 MG/DL (ref 0.51–0.95)
DEPRECATED HCO3 PLAS-SCNC: 22 MMOL/L (ref 22–29)
EGFRCR SERPLBLD CKD-EPI 2021: >90 ML/MIN/1.73M2
EOSINOPHIL # BLD AUTO: 0 10E3/UL (ref 0–0.7)
EOSINOPHIL NFR BLD AUTO: 1 %
ERYTHROCYTE [DISTWIDTH] IN BLOOD BY AUTOMATED COUNT: 13.6 % (ref 10–15)
ETHANOL SERPL-MCNC: <0.01 G/DL
GLUCOSE SERPL-MCNC: 102 MG/DL (ref 70–99)
GLUCOSE UR STRIP-MCNC: NEGATIVE MG/DL
HCT VFR BLD AUTO: 42.1 % (ref 35–47)
HGB BLD-MCNC: 14.3 G/DL (ref 11.7–15.7)
HGB UR QL STRIP: NEGATIVE
IMM GRANULOCYTES # BLD: 0 10E3/UL
IMM GRANULOCYTES NFR BLD: 0 %
KETONES UR STRIP-MCNC: NEGATIVE MG/DL
LEUKOCYTE ESTERASE UR QL STRIP: ABNORMAL
LYMPHOCYTES # BLD AUTO: 2.1 10E3/UL (ref 0.8–5.3)
LYMPHOCYTES NFR BLD AUTO: 31 %
MCH RBC QN AUTO: 29.7 PG (ref 26.5–33)
MCHC RBC AUTO-ENTMCNC: 34 G/DL (ref 31.5–36.5)
MCV RBC AUTO: 87 FL (ref 78–100)
MONOCYTES # BLD AUTO: 0.7 10E3/UL (ref 0–1.3)
MONOCYTES NFR BLD AUTO: 10 %
MUCOUS THREADS #/AREA URNS LPF: PRESENT /LPF
NEUTROPHILS # BLD AUTO: 3.8 10E3/UL (ref 1.6–8.3)
NEUTROPHILS NFR BLD AUTO: 57 %
NITRATE UR QL: POSITIVE
NRBC # BLD AUTO: 0 10E3/UL
NRBC BLD AUTO-RTO: 0 /100
PH UR STRIP: 5.5 [PH] (ref 5–7)
PLATELET # BLD AUTO: 251 10E3/UL (ref 150–450)
POTASSIUM SERPL-SCNC: 3.8 MMOL/L (ref 3.4–5.3)
PROT SERPL-MCNC: 7 G/DL (ref 6.4–8.3)
RBC # BLD AUTO: 4.82 10E6/UL (ref 3.8–5.2)
RBC URINE: 1 /HPF
SODIUM SERPL-SCNC: 142 MMOL/L (ref 135–145)
SP GR UR STRIP: 1.03 (ref 1–1.03)
SQUAMOUS EPITHELIAL: 1 /HPF
UROBILINOGEN UR STRIP-MCNC: <2 MG/DL
WBC # BLD AUTO: 6.7 10E3/UL (ref 4–11)
WBC URINE: 12 /HPF

## 2023-10-01 PROCEDURE — 81001 URINALYSIS AUTO W/SCOPE: CPT | Performed by: EMERGENCY MEDICINE

## 2023-10-01 PROCEDURE — 82077 ASSAY SPEC XCP UR&BREATH IA: CPT | Performed by: EMERGENCY MEDICINE

## 2023-10-01 PROCEDURE — 99285 EMERGENCY DEPT VISIT HI MDM: CPT | Mod: 25

## 2023-10-01 PROCEDURE — 258N000003 HC RX IP 258 OP 636: Performed by: EMERGENCY MEDICINE

## 2023-10-01 PROCEDURE — 36415 COLL VENOUS BLD VENIPUNCTURE: CPT | Performed by: EMERGENCY MEDICINE

## 2023-10-01 PROCEDURE — 96365 THER/PROPH/DIAG IV INF INIT: CPT

## 2023-10-01 PROCEDURE — 250N000011 HC RX IP 250 OP 636: Mod: JZ | Performed by: EMERGENCY MEDICINE

## 2023-10-01 PROCEDURE — 80053 COMPREHEN METABOLIC PANEL: CPT | Performed by: EMERGENCY MEDICINE

## 2023-10-01 PROCEDURE — 70450 CT HEAD/BRAIN W/O DYE: CPT | Mod: MG

## 2023-10-01 PROCEDURE — 96361 HYDRATE IV INFUSION ADD-ON: CPT

## 2023-10-01 PROCEDURE — 87186 SC STD MICRODIL/AGAR DIL: CPT | Performed by: EMERGENCY MEDICINE

## 2023-10-01 PROCEDURE — 85025 COMPLETE CBC W/AUTO DIFF WBC: CPT | Performed by: EMERGENCY MEDICINE

## 2023-10-01 RX ORDER — CEFDINIR 300 MG/1
300 CAPSULE ORAL 2 TIMES DAILY
Qty: 14 CAPSULE | Refills: 0 | Status: SHIPPED | OUTPATIENT
Start: 2023-10-01 | End: 2023-10-08

## 2023-10-01 RX ORDER — CEFTRIAXONE 1 G/1
1 INJECTION, POWDER, FOR SOLUTION INTRAMUSCULAR; INTRAVENOUS ONCE
Status: COMPLETED | OUTPATIENT
Start: 2023-10-01 | End: 2023-10-01

## 2023-10-01 RX ADMIN — SODIUM CHLORIDE 1000 ML: 9 INJECTION, SOLUTION INTRAVENOUS at 18:42

## 2023-10-01 RX ADMIN — CEFTRIAXONE SODIUM 1 G: 1 INJECTION, POWDER, FOR SOLUTION INTRAMUSCULAR; INTRAVENOUS at 21:13

## 2023-10-01 ASSESSMENT — ACTIVITIES OF DAILY LIVING (ADL)
ADLS_ACUITY_SCORE: 35
ADLS_ACUITY_SCORE: 35

## 2023-10-01 NOTE — PHARMACY-ADMISSION MEDICATION HISTORY
Pharmacist Admission Medication History    Admission medication history is complete. The information provided in this note is only as accurate as the sources available at the time of the update.    Medication reconciliation/reorder completed by provider prior to medication history? No    Information Source(s): Family member and CareEverywhere/SureScripts via in-person    Pertinent Information:  will bring in dalfampridine tomorrow 10/2.    Changes made to PTA medication list:  Added: None  Deleted: None  Changed: None    Medication Affordability:  Not including over the counter (OTC) medications, was there a time in the past 3 months when you did not take your medications as prescribed because of cost?: No    Allergies reviewed with patient and updates made in EHR: yes    Medication History Completed By: Chica Childs [facility] 10/1/2023 6:42 PM    Prior to Admission medications    Medication Sig Last Dose Taking? Auth Provider Long Term End Date   dalfampridine (AMPYRA) 10 MG TB12 12 hr tablet Take 1 tablet (10 mg) by mouth 2 times daily 10/1/2023 at x1 am Yes Samantha Humphrey MD     modafinil (PROVIGIL) 100 MG tablet Take 1-2 tablets (100-200 mg) by mouth daily as needed (ms related fatigue) Unknown Yes Samantha Humphrey MD     sertraline (ZOLOFT) 100 MG tablet TAKE 1 TABLET BY MOUTH  DAILY 10/1/2023 Yes Samantha Humphrey MD Yes

## 2023-10-01 NOTE — ED PROVIDER NOTES
EMERGENCY DEPARTMENT ENCOUNTER            IMPRESSION:  Urinary tract infection  History of MS  Encephalopathy resolved        MEDICAL DECISION MAKING:  It was my pleasure to provide care for Ruthie Aaron who presented for evaluation of altered mental status.  Patient has a history of MS.  She was recently hospitalized with similar symptoms and treated for urinary tract infection    On my exam patient is pleasant and cooperative.   Vital signs are normal.  Physical exam notable for to be initially confused.     IV fluid administered for symptom relief.  Patient's symptoms improved.     Laboratory investigation independently interpreted and notable for urinary tract infection with normal CBC and chemistry    Broad-spectrum antibiotics administered    CT imaging of the head is normal.  Imaging independently interpreted does not show stroke or mass    ED evaluation is consistent with urinary tract infection with resulting changes in mental status    Patient was observed for several hours in the emergency department and has returned to baseline.   felt comfortable with discharge home..      Patient was reevaluated and results were discussed.  I recommended outpatient antibiotics and follow-up with primary care      Prior to making a final disposition on this patient the results of patient's tests and other diagnostic studies were discussed with the patient. All questions were answered. Patient expressed understanding of the plan and was amenable.    Return precautions and follow-up were discussed.     =================================================================  CHIEF COMPLAINT:  Chief Complaint   Patient presents with    Altered Mental Status    Dysuria         HPI  Ruthie Aaron is a 68 year old female with a history of multiple sclerosis who presents to the ED by private car for evaluation of altered mental status.     Per chart review, the patient was admitted to [facility] from 8/29/2023 to  8/30/2023 for evaluation of progressive fatigue MRI of brain and cervical spine showed chronic changes with no new active lesions. Neurology was consulted who recommended she hold baclofen and resume modafinil. Lab work was unremarkable. UTI was the likely source of the confusion. Patient was discharged on cefotaxime with recommended PCP follow-up.    Per , earlier today his wife was watching TV when she suddenly became fatigued and wouldn't respond to questions. He notes that her symptoms are similar to those she had at the end of August when she was diagnosed with a bladder infection. Patient's  reports that the last few days she has been behaving at baseline. States that she has had a mildly decreased appetite today, but she has otherwise been eating normally. No recent medication changes. He notes a history of MS in the patient. No other concerns reported at this time.    REVIEW OF SYSTEMS  Constitutional: Does not report chills or unintentional weight loss. Positive for fatigue and decreased appetite.  Eyes: Does not report visual changes or discharge    HENT: Does not report sore throat, ear pain or neck pain  Respiratory: Does not report cough or shortness of breath    Cardiovascular: Does not report chest pain, palpitations or leg swelling  GI: Does not report abdominal pain, nausea, vomiting, or dark, bloody stools.    : Does not report hematuria, dysuria, or flank pain  Musculoskeletal: Does not report any new musculoskeletal pain or new muscle/joint pains  Skin: Does not report rash or wound  Neurologic: Does not report current headache, new weakness, focal weakness, or sensory changes . Positive for altered mental status.       Remainder of systems reviewed, unless noted in HPI all others negative.      PAST MEDICAL HISTORY:  History reviewed. No pertinent past medical history.    PAST SURGICAL HISTORY:  History reviewed. No pertinent surgical history.      CURRENT MEDICATIONS:   "  cefdinir (OMNICEF) 300 MG capsule  dalfampridine (AMPYRA) 10 MG TB12 12 hr tablet  modafinil (PROVIGIL) 100 MG tablet  sertraline (ZOLOFT) 100 MG tablet        ALLERGIES:  Allergies   Allergen Reactions    Sulfacetamide      Other reaction(s): hives       FAMILY HISTORY:  Family History   Problem Relation Age of Onset    Arthritis Mother     Multiple Sclerosis Brother        SOCIAL HISTORY:   Social History     Socioeconomic History    Marital status:    Tobacco Use    Smoking status: Former    Smokeless tobacco: Never   Substance and Sexual Activity    Alcohol use: Not Currently       PHYSICAL EXAM:    /75   Pulse 90   Temp 98.6  F (37  C) (Oral)   Resp 18   Ht 1.626 m (5' 4\")   Wt 61.2 kg (135 lb)   SpO2 96%   BMI 23.17 kg/m      Constitutional: She is awake and responsive but somewhat lethargic  Head: Normocephalic, atraumatic.  ENT: Mucous membranes are moist.  No pallor.   Eyes: Pupils are reactive.  No discoloration.  Neck: No lymphadenopathy, no stridor, supple, no soft tissue swelling  Chest: No tenderness   Respiratory: Respirations even, unlabored. Lungs clear to ascultation bilaterally, in no acute respiratory distress.  Cardiovascular: Regular rate and rhythm.  Good overall perfusion.  Upper and lower extremity pulses are equal.  GI: Abdomen soft, non-tender to palpation.  No guarding or rebound. Bowel sounds present throughout.   Back: No CVA tenderness.    Musculoskeletal: Moves all 4 extremities equally, full function and capacity no peripheral edema.   Integument: Warm, dry. No rash. No bruising or petechiae.  Neurologic: Awake and responsive follows commands somewhat lethargic  Psychiatric: Normal mood and affect.  Appropriate judgement.    ED COURSE:  6:08 PM Met with and introduced myself to the patient. Discussed history and plan of care.  9:00 PM Rechecked and updated patient on lab and imaging results. Discussed plan for discharge.    Medical Decision " Making    History:  Supplemental history from:   External Record(s) reviewed: External medical records including care everywhere reviewed     Work Up:  EKG, laboratory and imaging studies as ordered were independently interpreted by myself.   Broad differential diagnosis considered for altered mental status  The patient's presentation was of high complexity.     External consultation:  Discussion of management with another provider: Pharmacy    Complicating factors:  Patient has a complicated past medical history including multiple sclerosis   Care affected by social determinants of health: Access to primary care    Disposition involved shared decision-making with the patient.  The patient's management necessitated high risk regarding consideration for hospitalization for altered mental status    The patient was prescribed medication antibiotic     Patient otherwise to continue outpatient medications as prescribed.       LAB:  Laboratory results were independently reviewed and interpreted  Results for orders placed or performed during the hospital encounter of 10/01/23   CT Head w/o Contrast    Impression    IMPRESSION:  1.  No convincing findings of acute transcortical infarct. Underlying chronic white matter changes can limit this assessment by CT. If sufficient clinical concern warrants further imaging and there is no contraindication, consider MRI.  2.  No acute intracranial hemorrhage or mass effect.       Comprehensive metabolic panel   Result Value Ref Range    Sodium 142 135 - 145 mmol/L    Potassium 3.8 3.4 - 5.3 mmol/L    Carbon Dioxide (CO2) 22 22 - 29 mmol/L    Anion Gap 13 7 - 15 mmol/L    Urea Nitrogen 17.8 8.0 - 23.0 mg/dL    Creatinine 0.60 0.51 - 0.95 mg/dL    GFR Estimate >90 >60 mL/min/1.73m2    Calcium 9.6 8.8 - 10.2 mg/dL    Chloride 107 98 - 107 mmol/L    Glucose 102 (H) 70 - 99 mg/dL    Alkaline Phosphatase 96 35 - 104 U/L    AST 22 0 - 45 U/L    ALT 12 0 - 50 U/L    Protein Total 7.0  6.4 - 8.3 g/dL    Albumin 4.3 3.5 - 5.2 g/dL    Bilirubin Total 0.2 <=1.2 mg/dL   UA with Microscopic reflex to Culture    Specimen: Urine, Midstream   Result Value Ref Range    Color Urine Light Yellow Colorless, Straw, Light Yellow, Yellow    Appearance Urine Turbid (A) Clear    Glucose Urine Negative Negative mg/dL    Bilirubin Urine Negative Negative    Ketones Urine Negative Negative mg/dL    Specific Gravity Urine 1.026 1.001 - 1.030    Blood Urine Negative Negative    pH Urine 5.5 5.0 - 7.0    Protein Albumin Urine 10 (A) Negative mg/dL    Urobilinogen Urine <2.0 <2.0 mg/dL    Nitrite Urine Positive (A) Negative    Leukocyte Esterase Urine 75 Shyann/uL (A) Negative    Bacteria Urine Many (A) None Seen /HPF    Mucus Urine Present (A) None Seen /LPF    RBC Urine 1 <=2 /HPF    WBC Urine 12 (H) <=5 /HPF    Squamous Epithelials Urine 1 <=1 /HPF   Ethyl Alcohol Level   Result Value Ref Range    Alcohol ethyl <0.01 <=0.01 g/dL   CBC with platelets and differential   Result Value Ref Range    WBC Count 6.7 4.0 - 11.0 10e3/uL    RBC Count 4.82 3.80 - 5.20 10e6/uL    Hemoglobin 14.3 11.7 - 15.7 g/dL    Hematocrit 42.1 35.0 - 47.0 %    MCV 87 78 - 100 fL    MCH 29.7 26.5 - 33.0 pg    MCHC 34.0 31.5 - 36.5 g/dL    RDW 13.6 10.0 - 15.0 %    Platelet Count 251 150 - 450 10e3/uL    % Neutrophils 57 %    % Lymphocytes 31 %    % Monocytes 10 %    % Eosinophils 1 %    % Basophils 1 %    % Immature Granulocytes 0 %    NRBCs per 100 WBC 0 <1 /100    Absolute Neutrophils 3.8 1.6 - 8.3 10e3/uL    Absolute Lymphocytes 2.1 0.8 - 5.3 10e3/uL    Absolute Monocytes 0.7 0.0 - 1.3 10e3/uL    Absolute Eosinophils 0.0 0.0 - 0.7 10e3/uL    Absolute Basophils 0.0 0.0 - 0.2 10e3/uL    Absolute Immature Granulocytes 0.0 <=0.4 10e3/uL    Absolute NRBCs 0.0 10e3/uL         RADIOLOGY:  Radiology reports were independently reviewed and interpreted  CT Head w/o Contrast   Final Result   IMPRESSION:   1.  No convincing findings of acute transcortical  infarct. Underlying chronic white matter changes can limit this assessment by CT. If sufficient clinical concern warrants further imaging and there is no contraindication, consider MRI.   2.  No acute intracranial hemorrhage or mass effect.                   MEDICATIONS GIVEN IN THE EMERGENCY:  Medications   cefTRIAXone (ROCEPHIN) 1 g vial to attach to  mL bag for ADULTS or NS 50 mL bag for PEDS (has no administration in time range)   sodium chloride 0.9% BOLUS 1,000 mL (0 mLs Intravenous Stopped 10/1/23 2008)           NEW PRESCRIPTIONS STARTED AT TODAY'S ER VISIT:  New Prescriptions    CEFDINIR (OMNICEF) 300 MG CAPSULE    Take 1 capsule (300 mg) by mouth 2 times daily for 7 days                FINAL DIAGNOSIS:    ICD-10-CM    1. Acute cystitis without hematuria  N30.00                  NAME: Ruthie Aaron  AGE: 68 year old female  YOB: 1955  MRN: [id number]  EVALUATION DATE & TIME: No admission date for patient encounter.    PCP: Soni Gil    ED PROVIDER: Adriano Badillo M.D.      Shamika YADAV, am serving as a scribe to document services personally performed by Dr. Adriano Badillo based on my observation and the provider's statements to me. IAdriano MD attest that Shamika Hairston is acting in a scribe capacity, has observed my performance of the services and has documented them in accordance with my direction.    Adriano Badillo M.D.  Emergency Medicine  [facility] EMERGENCY DEPARTMENT  [facility] [facility] [id number] [phone]  Dept: [phone]  10/1/2023         Adriano Badillo MD  10/01/23 [id number]

## 2023-10-01 NOTE — ED TRIAGE NOTES
Pt presents to ED with altered mental status and dysuria. Pt does not converse, withdrawn.  states she presented very similar with her last urine infection. Pt did complain of some dysuria prior to being this quiet.

## 2023-10-03 LAB — BACTERIA UR CULT: ABNORMAL

## 2023-10-27 ENCOUNTER — THERAPY VISIT (OUTPATIENT)
Dept: OCCUPATIONAL THERAPY | Facility: REHABILITATION | Age: 68
End: 2023-10-27
Attending: PSYCHIATRY & NEUROLOGY
Payer: MEDICARE

## 2023-10-27 DIAGNOSIS — R41.3 MEMORY CHANGE: ICD-10-CM

## 2023-10-27 DIAGNOSIS — G35 MS (MULTIPLE SCLEROSIS) (H): ICD-10-CM

## 2023-10-27 DIAGNOSIS — Z78.9 DECREASED ACTIVITIES OF DAILY LIVING (ADL): Primary | ICD-10-CM

## 2023-10-27 PROCEDURE — 97535 SELF CARE MNGMENT TRAINING: CPT | Mod: GO | Performed by: OCCUPATIONAL THERAPIST

## 2023-10-27 PROCEDURE — 97165 OT EVAL LOW COMPLEX 30 MIN: CPT | Mod: GO | Performed by: OCCUPATIONAL THERAPIST

## 2023-10-27 NOTE — PROGRESS NOTES
"OCCUPATIONAL THERAPY EVALUATION  Type of Visit: Evaluation    See electronic medical record for Abuse and Falls Screening details.      Presenting condition or subjective complaint:   Per neurology note in EMR on 9-6-23, \"She recently was seen in the ER for a urinary tract infection.  Leading up to that she was experiencing visual hallucinations.  She describes the hallucinationsaliens dropping off people in the yard.  She notes that her memory is hazy.  On the day that she was brought to the ER she was very difficult to arouse.     She has a tendency to be very sleepy throughout the day.  She has difficulty keeping track of the date.     She has not experienced any tremor.  Balance is impaired.  Her  notes that she is swinging her right leg more when going upstairs.  Her gait has slowed down.     Family has been cautious to monitor her medication intake.  She has been tapered off of baclofen which has been helpful for her alertness.     She denies symptoms of REM behavior sleep disorder.     She is noted to alternate between good days and bad days.  On a good day she is better able to communicate her thoughts and have a conversation.  However on a bad day she has difficulty forming a sentence and will be slow to respond to questions.  On occasion she will even have difficulty keeping track of the month or identifying family members.     There is a positive family history of late onset dementia and 2 older siblings.\"       Date of onset: 09/06/23 (date of referral to OT)    Relevant medical history:   HLD, osteoporosis   Dates & types of surgery:  None    Prior diagnostic imaging/testing results:      See EMR  Prior therapy history for the same diagnosis, illness or injury:     None    Prior Level of Function  Transfers: Independent  Ambulation: Independent  ADL: Independent  IADL: Laundry, Meal preparation    Living Environment  Social support/Type of home:    Patient lives with her  in 1.5 level " home. They live on the main level and occasionally go to the basement.   Stairs to enter the home:       3  Ramp:   no  Stairs inside the home:       17 to the basement  Help at home:   cleaning person once a month  Equipment owned:       Employment:    no  Hobbies/Interests:   T.V., grandchildren    Patient goals for therapy:   Patient is not sure    Pain assessment: Pain denied     Objective     Cognitive Status Examination  Orientation: Person   Level of Consciousness: Alert  Follows Commands and Answers Questions: 100% of the time  Personal Safety and Judgement: Intact  Memory: Impaired  Attention: No deficits identified  Organization/Problem Solving: NT  Executive Function: Working memory impaired, decreased storage of information for performing tasks    VISUAL SKILLS  Visual Acuity: No deficits identified, Wears glasses  Visual Field: Appears normal  Visual Attention: Appears normal  Oculomotor: NT    SENSATION: UE Sensation WNL, LE Sensation WNL    RANGE OF MOTION: WNL B UE  STRENGTH: NT    FUNCTIONAL MOBILITY  Assistive Device(s): Cane (single end), Walker (front wheeled)  Ambulation: independent  Wheelchair: no    BED MOBILITY: Independent    TRANSFERS: Contact Guard, SBA    BATHING: Independent  Equipment: Built in seat    UPPER BODY DRESSING: Independent  Equipment: none    LOWER BODY DRESSING: Independent  Equipment: none    TOILETING: Independent  Equipment: none    GROOMING: Independent  Equipment: none    EATING/SELF FEEDING: Independent   Equipment: none    ACTIVITY TOLERANCE: Fair    INSTRUMENTAL ACTIVITIES OF DAILY LIVING (IADL):   Meal Planning/Prep: Patient's  does the grocery shopping and cooking  Home/Financial Management: Patient's  does the cleaning and laundry. They have a cleaning person once a month. Patient's  manages the finances.  Medication Management: Patient's  sets up weekly pill box and administers med to the patient   Communication/Computer Use:  None  Community Mobility:   Care of Others: N/A      Assessment & Plan   CLINICAL IMPRESSIONS  Medical Diagnosis: MS (multiple sclerosis) (H) (G35)    Memory change (R41.3)    Treatment Diagnosis: impaired ADL and IADL's, change in cognition    Impression/Assessment: Pt is a 68 year old female presenting to Occupational Therapy due to MS.  The following significant findings have been identified: Impaired cognition and Impaired safety/judgement.  These identified deficits interfere with their ability to perform self care tasks, household chores, medication management, and financial management as compared to previous level of function.     Clinical Decision Making (Complexity):  Assessment of Occupational Performance: 3-5 Performance Deficits  Occupational Performance Limitations: functional mobility, health management and maintenance, home establishment and management, meal preparation and cleanup, and leisure activities  Clinical Decision Making (Complexity): Low complexity    PLAN OF CARE  Treatment Interventions:  Interventions: Self-Care/Home Management, Therapeutic Activity    Long Term Goals   OT Goal 1  Goal Identifier: (P) memory  Goal Description: (P) Patient to utilize memory tools (planner, calendar, etc.) effectively and independently for increased ability to manage time, appointments, daily schedule, etc.  Target Date: 01/19/24  OT Goal 2  Goal Identifier: (P) safety  Goal Description: (P) Patient to respond to home safety/judgment scenarios with 90% accuracy or greater for ability to stay home alone for longer periods of time while spouse is away.  Target Date: (P) 01/19/23  OT Goal 3  Goal Identifier: (P) energy conservation  Goal Description: (P) Patient to state 2 strategies for energy conservation for improved independence with ADL/IADLs  Target Date: (P) 01/19/23  OT Goal 4  Goal Identifier: (P) AE  Goal Description: (P) Patient will consider AE to increase safety and independence with  ADL's  Target Date: (P) 01/19/23      Frequency of Treatment: once a week  Duration of Treatment: 12 weeks     Recommended Referrals to Other Professionals: no  Education Assessment: Learner/Method: Patient;Significant Other     Risks and benefits of evaluation/treatment have been explained.   Patient/Family/caregiver agrees with Plan of Care.     Evaluation Time:    OT Twyla Low Complexity Minutes (31710): (P) 30       Signing Clinician: Conchis Gamez OT      [facility]                                                                                   OUTPATIENT OCCUPATIONAL THERAPY      PLAN OF TREATMENT FOR OUTPATIENT REHABILITATION   Patient's Last Name, First Name, Ruthie Flower YOB: 1955   Provider's Name   [facility]   Medical Record No.  [id number]     Onset Date: 09/06/23 (date of referral to OT) Start of Care Date: 10/27/23     Medical Diagnosis:  MS (multiple sclerosis) (H) (G35)    Memory change (R41.3)      OT Treatment Diagnosis:  impaired ADL and IADL's, change in cognition Plan of Treatment  Frequency/Duration:once a week/12 weeks    Certification date from 10/27/23   To 01/19/24        See note for plan of treatment details and functional goals     Conchis Gamez OT                         I CERTIFY THE NEED FOR THESE SERVICES FURNISHED UNDER        THIS PLAN OF TREATMENT AND WHILE UNDER MY CARE     (Physician attestation of this document indicates review and certification of the therapy plan).                Referring Provider:  Samantha Humphrey MD      Initial Assessment  See Epic Evaluation- 10/27/23

## 2023-10-31 DIAGNOSIS — G35 MS (MULTIPLE SCLEROSIS) (H): ICD-10-CM

## 2023-10-31 DIAGNOSIS — G82.20 PARAPARESIS (H): ICD-10-CM

## 2023-10-31 RX ORDER — DALFAMPRIDINE 10 MG/1
10 TABLET, FILM COATED, EXTENDED RELEASE ORAL 2 TIMES DAILY
Qty: 60 TABLET | Refills: 5 | Status: SHIPPED | OUTPATIENT
Start: 2023-10-31 | End: 2024-01-10 | Stop reason: SINTOL

## 2023-10-31 NOTE — TELEPHONE ENCOUNTER
Received refill request for dalfampridine from [facility]; Patient was last seen in Sep 2023 and has follow up appointment in Jan 2024 with Dr Humphrey. Refilled per MS refill protocol.    Madelaine Schmidt RN

## 2023-11-20 ENCOUNTER — THERAPY VISIT (OUTPATIENT)
Dept: OCCUPATIONAL THERAPY | Facility: REHABILITATION | Age: 68
End: 2023-11-20
Payer: MEDICARE

## 2023-11-20 DIAGNOSIS — R41.3 MEMORY CHANGE: ICD-10-CM

## 2023-11-20 DIAGNOSIS — Z78.9 DECREASED ACTIVITIES OF DAILY LIVING (ADL): ICD-10-CM

## 2023-11-20 DIAGNOSIS — G35 MS (MULTIPLE SCLEROSIS) (H): Primary | ICD-10-CM

## 2023-11-20 PROCEDURE — 97535 SELF CARE MNGMENT TRAINING: CPT | Mod: GO | Performed by: OCCUPATIONAL THERAPIST

## 2023-12-21 DIAGNOSIS — G35 RELAPSING REMITTING MULTIPLE SCLEROSIS (H): ICD-10-CM

## 2023-12-21 RX ORDER — SERTRALINE HYDROCHLORIDE 100 MG/1
100 TABLET, FILM COATED ORAL DAILY
Qty: 90 TABLET | Refills: 3 | Status: SHIPPED | OUTPATIENT
Start: 2023-12-21

## 2023-12-21 NOTE — TELEPHONE ENCOUNTER
Received refill request for sertraline from [facility]; Patient was last seen in Sep 2023 and has follow up appointment in Jan 2024 with Dr Humphrey. Refilled per MS refill protocol.    Madelaine Schmidt RN      
negative

## 2023-12-29 ENCOUNTER — HOSPITAL ENCOUNTER (EMERGENCY)
Facility: HOSPITAL | Age: 68
Discharge: HOME | End: 2023-12-29
Attending: EMERGENCY MEDICINE | Admitting: EMERGENCY MEDICINE
Payer: MEDICARE

## 2023-12-29 ENCOUNTER — APPOINTMENT (OUTPATIENT)
Dept: RADIOLOGY | Facility: HOSPITAL | Age: 68
End: 2023-12-29
Attending: EMERGENCY MEDICINE
Payer: MEDICARE

## 2023-12-29 VITALS
DIASTOLIC BLOOD PRESSURE: 78 MMHG | OXYGEN SATURATION: 97 % | HEIGHT: 62 IN | WEIGHT: 140 LBS | TEMPERATURE: 98.1 F | SYSTOLIC BLOOD PRESSURE: 155 MMHG | HEART RATE: 74 BPM | BODY MASS INDEX: 25.76 KG/M2 | RESPIRATION RATE: 16 BRPM

## 2023-12-29 DIAGNOSIS — G93.40 ENCEPHALOPATHY: ICD-10-CM

## 2023-12-29 LAB
ALBUMIN UR-MCNC: NEGATIVE MG/DL
ANION GAP SERPL CALCULATED.3IONS-SCNC: 10 MMOL/L (ref 7–15)
APPEARANCE UR: ABNORMAL
BACTERIA #/AREA URNS HPF: ABNORMAL /HPF
BASOPHILS # BLD AUTO: 0 10E3/UL (ref 0–0.2)
BASOPHILS NFR BLD AUTO: 0 %
BILIRUB UR QL STRIP: NEGATIVE
BUN SERPL-MCNC: 10.2 MG/DL (ref 8–23)
CALCIUM SERPL-MCNC: 9.3 MG/DL (ref 8.8–10.2)
CHLORIDE SERPL-SCNC: 103 MMOL/L (ref 98–107)
COLOR UR AUTO: YELLOW
CREAT SERPL-MCNC: 0.64 MG/DL (ref 0.51–0.95)
DEPRECATED HCO3 PLAS-SCNC: 25 MMOL/L (ref 22–29)
EGFRCR SERPLBLD CKD-EPI 2021: >90 ML/MIN/1.73M2
EOSINOPHIL # BLD AUTO: 0 10E3/UL (ref 0–0.7)
EOSINOPHIL NFR BLD AUTO: 0 %
ERYTHROCYTE [DISTWIDTH] IN BLOOD BY AUTOMATED COUNT: 12.6 % (ref 10–15)
FLUAV RNA SPEC QL NAA+PROBE: NEGATIVE
FLUBV RNA RESP QL NAA+PROBE: NEGATIVE
GLUCOSE SERPL-MCNC: 110 MG/DL (ref 70–99)
GLUCOSE UR STRIP-MCNC: NEGATIVE MG/DL
HCT VFR BLD AUTO: 42.6 % (ref 35–47)
HGB BLD-MCNC: 14.2 G/DL (ref 11.7–15.7)
HGB UR QL STRIP: NEGATIVE
IMM GRANULOCYTES # BLD: 0 10E3/UL
IMM GRANULOCYTES NFR BLD: 0 %
KETONES UR STRIP-MCNC: NEGATIVE MG/DL
LEUKOCYTE ESTERASE UR QL STRIP: ABNORMAL
LYMPHOCYTES # BLD AUTO: 1.4 10E3/UL (ref 0.8–5.3)
LYMPHOCYTES NFR BLD AUTO: 14 %
MCH RBC QN AUTO: 29.8 PG (ref 26.5–33)
MCHC RBC AUTO-ENTMCNC: 33.3 G/DL (ref 31.5–36.5)
MCV RBC AUTO: 89 FL (ref 78–100)
MONOCYTES # BLD AUTO: 0.6 10E3/UL (ref 0–1.3)
MONOCYTES NFR BLD AUTO: 6 %
MUCOUS THREADS #/AREA URNS LPF: PRESENT /LPF
NEUTROPHILS # BLD AUTO: 8 10E3/UL (ref 1.6–8.3)
NEUTROPHILS NFR BLD AUTO: 80 %
NITRATE UR QL: POSITIVE
NRBC # BLD AUTO: 0 10E3/UL
NRBC BLD AUTO-RTO: 0 /100
PH UR STRIP: 5.5 [PH] (ref 5–7)
PLATELET # BLD AUTO: 336 10E3/UL (ref 150–450)
POTASSIUM SERPL-SCNC: 4.2 MMOL/L (ref 3.4–5.3)
RBC # BLD AUTO: 4.77 10E6/UL (ref 3.8–5.2)
RBC URINE: 2 /HPF
RSV RNA SPEC NAA+PROBE: NEGATIVE
SARS-COV-2 RNA RESP QL NAA+PROBE: NEGATIVE
SODIUM SERPL-SCNC: 138 MMOL/L (ref 135–145)
SP GR UR STRIP: 1.02 (ref 1–1.03)
SQUAMOUS EPITHELIAL: 17 /HPF
UROBILINOGEN UR STRIP-MCNC: <2 MG/DL
WBC # BLD AUTO: 10 10E3/UL (ref 4–11)
WBC URINE: 8 /HPF

## 2023-12-29 PROCEDURE — 82374 ASSAY BLOOD CARBON DIOXIDE: CPT | Performed by: EMERGENCY MEDICINE

## 2023-12-29 PROCEDURE — 71046 X-RAY EXAM CHEST 2 VIEWS: CPT

## 2023-12-29 PROCEDURE — 87637 SARSCOV2&INF A&B&RSV AMP PRB: CPT | Mod: GZ | Performed by: EMERGENCY MEDICINE

## 2023-12-29 PROCEDURE — 87086 URINE CULTURE/COLONY COUNT: CPT | Performed by: EMERGENCY MEDICINE

## 2023-12-29 PROCEDURE — 99285 EMERGENCY DEPT VISIT HI MDM: CPT | Mod: 25

## 2023-12-29 PROCEDURE — 81001 URINALYSIS AUTO W/SCOPE: CPT | Performed by: EMERGENCY MEDICINE

## 2023-12-29 PROCEDURE — 250N000013 HC RX MED GY IP 250 OP 250 PS 637: Performed by: EMERGENCY MEDICINE

## 2023-12-29 PROCEDURE — 93005 ELECTROCARDIOGRAM TRACING: CPT | Performed by: EMERGENCY MEDICINE

## 2023-12-29 PROCEDURE — 36415 COLL VENOUS BLD VENIPUNCTURE: CPT | Performed by: EMERGENCY MEDICINE

## 2023-12-29 PROCEDURE — 96360 HYDRATION IV INFUSION INIT: CPT

## 2023-12-29 PROCEDURE — 85025 COMPLETE CBC W/AUTO DIFF WBC: CPT | Performed by: EMERGENCY MEDICINE

## 2023-12-29 PROCEDURE — 258N000003 HC RX IP 258 OP 636: Performed by: EMERGENCY MEDICINE

## 2023-12-29 RX ORDER — CEFDINIR 300 MG/1
300 CAPSULE ORAL 2 TIMES DAILY
Qty: 14 CAPSULE | Refills: 0 | Status: SHIPPED | OUTPATIENT
Start: 2023-12-29 | End: 2024-01-05

## 2023-12-29 RX ORDER — CEFDINIR 300 MG/1
300 CAPSULE ORAL ONCE
Status: COMPLETED | OUTPATIENT
Start: 2023-12-29 | End: 2023-12-29

## 2023-12-29 RX ADMIN — CEFDINIR 300 MG: 300 CAPSULE ORAL at 16:48

## 2023-12-29 RX ADMIN — SODIUM CHLORIDE 1000 ML: 9 INJECTION, SOLUTION INTRAVENOUS at 16:18

## 2023-12-29 ASSESSMENT — ACTIVITIES OF DAILY LIVING (ADL): ADLS_ACUITY_SCORE: 35

## 2023-12-29 NOTE — ED PROVIDER NOTES
"EMERGENCY DEPARTMENT ENCOUNTER            IMPRESSION:  History of MS  Encephalopathy secondary to urinary tract infection        MEDICAL DECISION MAKING:  It was my pleasure to provide care for Ruthie Aaron who presented for evaluation of altered mental status    On my exam patient is pleasant and cooperative.   Vital signs are normal.  Physical exam notable for some impairment.  Some difficulty with ambulation.     IV fluid administered for symptom relief.  Patient's symptoms improved.     Laboratory investigation independently interpreted and notable for normal CBC and BMP.  Urinalysis consistent with infection    Chest imaging is normal.  Imaging independently interpreted by myself and shows no pneumonia    She was given a dose of antibiotics for urinary tract infection    She has returned to her baseline    ED evaluation is consistent with encephalopathy secondary to urinary tract.      Patient was reevaluated and results were discussed.  I recommended discharge home with IV antibiotic      Prior to making a final disposition on this patient the results of patient's tests and other diagnostic studies were discussed with the patient. All questions were answered. Patient expressed understanding of the plan and was amenable.    Return precautions and follow-up were discussed.     =================================================================  CHIEF COMPLAINT:  Chief Complaint   Patient presents with    lethargy         HPI  Ruthie Aaron is a 68 year old female with a history of sclerosis who presents to the ED by car for evaluation of feeling lethargy.     Per spouse, the patient has been having periods of lethargy where she does not respond appropriate. Last night while watching TV, patient's spouse saw her bend over and states \"she was completely out of it\". She returned to being normally later. This morning, patient endorse another episode of lethargy and was \"out of it\". Patient's spouse give her a shower " "and she was still \"out of it\". He also state she was looking around the room and was concerned about \"snakes in the room\". Patient has MS. Spouse states this has occurred before when patient had UTI. Patient sates she has urgency with voiding and endorses chronic lower back pain and abdominal pain due to sitting  a lot. Patient is now alert and awake. No other complaint rigt now.         REVIEW OF SYSTEMS  Constitutional: Does not report chills, unintentional weight loss or fatigue   Eyes: Does not report visual changes or discharge    HENT: Does not report sore throat, ear pain or neck pain  Respiratory: Does not report cough or shortness of breath    Cardiovascular: Does not report chest pain, palpitations or leg swelling  GI: Does not report abdominal pain, nausea, vomiting, or dark, bloody stools.    : Does not report hematuria, dysuria, or flank pain  Musculoskeletal: Does not report any new musculoskeletal pain or new muscle/joint pains  Skin: Does not report rash or wound  Neurologic: Does not report current headache, new weakness, focal weakness, or sensory changes        Remainder of systems reviewed, unless noted in HPI all others negative.      PAST MEDICAL HISTORY:  History reviewed. No pertinent past medical history.    PAST SURGICAL HISTORY:  No past surgical history on file.      CURRENT MEDICATIONS:    cefdinir (OMNICEF) 300 MG capsule  dalfampridine (AMPYRA) 10 MG TB12 12 hr tablet  modafinil (PROVIGIL) 100 MG tablet  sertraline (ZOLOFT) 100 MG tablet        ALLERGIES:  Allergies   Allergen Reactions    Sulfacetamide      Other reaction(s): hives       FAMILY HISTORY:  Family History   Problem Relation Age of Onset    Arthritis Mother     Multiple Sclerosis Brother        SOCIAL HISTORY:   Social History     Socioeconomic History    Marital status:    Tobacco Use    Smoking status: Former    Smokeless tobacco: Never   Substance and Sexual Activity    Alcohol use: Not Currently " "      PHYSICAL EXAM:    BP (!) 155/78   Pulse 74   Temp 98.1  F (36.7  C) (Oral)   Resp 16   Ht 1.575 m (5' 2\")   Wt 63.5 kg (140 lb)   SpO2 97%   BMI 25.61 kg/m      Constitutional: She is awake and responsive  Head: Normocephalic, atraumatic.  ENT: Mucous membranes are moist.  No pallor.   Eyes: Pupils are reactive.  No discoloration.  Neck: No lymphadenopathy, no stridor, supple, no soft tissue swelling  Chest: No tenderness   Respiratory: Respirations even, unlabored. Lungs clear to ascultation bilaterally, in no acute respiratory distress.  Cardiovascular: Regular rate and rhythm.  Good overall perfusion.  Upper and lower extremity pulses are equal.  GI: Abdomen soft, non-tender to palpation.  No guarding or rebound. Bowel sounds present throughout.   Back: No CVA tenderness.    Musculoskeletal: Moves all 4 extremities equally, full function and capacity no peripheral edema.   Integument: Warm, dry. No rash. No bruising or petechiae.  Neurologic: There is lower extremity weakness and disability.  She is alert and oriented  Psychiatric: Normal mood and affect.  Appropriate judgement.    ED COURSE:  4:05 PM I met the patient.         Medical Decision Making    History:  Supplemental history from: Family,   External Record(s) reviewed: External medical records including care everywhere reviewed- Patient requested a refill for sertraline from [facility] at [facility] Multiple sclerosis [facility], on 12/21/23.     Work Up:  EKG, laboratory and imaging studies as ordered were independently interpreted by myself.   Broad differential diagnosis considered for altered mental  The patient's presentation was of high complexity.       Complicating factors:  Patient has a complicated past medical history including;multiple sclerosis.   Care affected by social determinants of health: Access to primary care    Disposition involved shared decision-making with the patient.  The patient's management " necessitated high risk regarding consideration for hospitalization for altered mental    The patient was prescribed medication antibiotic     Patient otherwise to continue outpatient medications as prescribed.       LAB:  Laboratory results were independently reviewed and interpreted  Results for orders placed or performed during the hospital encounter of 12/29/23   XR Chest 2 Views    Impression    IMPRESSION: The lungs are clear. The heart size and pulmonary vascularity are normal. There is no pneumothorax or pleural effusion. Thoracic aorta is mildly tortuous. Mild degenerative change in the thoracic spine. Negative chest radiograph.   Basic metabolic panel   Result Value Ref Range    Sodium 138 135 - 145 mmol/L    Potassium 4.2 3.4 - 5.3 mmol/L    Chloride 103 98 - 107 mmol/L    Carbon Dioxide (CO2) 25 22 - 29 mmol/L    Anion Gap 10 7 - 15 mmol/L    Urea Nitrogen 10.2 8.0 - 23.0 mg/dL    Creatinine 0.64 0.51 - 0.95 mg/dL    GFR Estimate >90 >60 mL/min/1.73m2    Calcium 9.3 8.8 - 10.2 mg/dL    Glucose 110 (H) 70 - 99 mg/dL   UA with Microscopic reflex to Culture    Specimen: Urine, Clean Catch   Result Value Ref Range    Color Urine Yellow Colorless, Straw, Light Yellow, Yellow    Appearance Urine Turbid (A) Clear    Glucose Urine Negative Negative mg/dL    Bilirubin Urine Negative Negative    Ketones Urine Negative Negative mg/dL    Specific Gravity Urine 1.023 1.001 - 1.030    Blood Urine Negative Negative    pH Urine 5.5 5.0 - 7.0    Protein Albumin Urine Negative Negative mg/dL    Urobilinogen Urine <2.0 <2.0 mg/dL    Nitrite Urine Positive (A) Negative    Leukocyte Esterase Urine 75 Shyann/uL (A) Negative    Bacteria Urine Many (A) None Seen /HPF    Mucus Urine Present (A) None Seen /LPF    RBC Urine 2 <=2 /HPF    WBC Urine 8 (H) <=5 /HPF    Squamous Epithelials Urine 17 (H) <=1 /HPF   Symptomatic Influenza A/B, RSV, & SARS-CoV2 PCR (COVID-19) Nose    Specimen: Nose; Swab   Result Value Ref Range    Influenza A  PCR Negative Negative    Influenza B PCR Negative Negative    RSV PCR Negative Negative    SARS CoV2 PCR Negative Negative   CBC with platelets and differential   Result Value Ref Range    WBC Count 10.0 4.0 - 11.0 10e3/uL    RBC Count 4.77 3.80 - 5.20 10e6/uL    Hemoglobin 14.2 11.7 - 15.7 g/dL    Hematocrit 42.6 35.0 - 47.0 %    MCV 89 78 - 100 fL    MCH 29.8 26.5 - 33.0 pg    MCHC 33.3 31.5 - 36.5 g/dL    RDW 12.6 10.0 - 15.0 %    Platelet Count 336 150 - 450 10e3/uL    % Neutrophils 80 %    % Lymphocytes 14 %    % Monocytes 6 %    % Eosinophils 0 %    % Basophils 0 %    % Immature Granulocytes 0 %    NRBCs per 100 WBC 0 <1 /100    Absolute Neutrophils 8.0 1.6 - 8.3 10e3/uL    Absolute Lymphocytes 1.4 0.8 - 5.3 10e3/uL    Absolute Monocytes 0.6 0.0 - 1.3 10e3/uL    Absolute Eosinophils 0.0 0.0 - 0.7 10e3/uL    Absolute Basophils 0.0 0.0 - 0.2 10e3/uL    Absolute Immature Granulocytes 0.0 <=0.4 10e3/uL    Absolute NRBCs 0.0 10e3/uL         RADIOLOGY:  Radiology reports were independently reviewed and interpreted  XR Chest 2 Views   Final Result   IMPRESSION: The lungs are clear. The heart size and pulmonary vascularity are normal. There is no pneumothorax or pleural effusion. Thoracic aorta is mildly tortuous. Mild degenerative change in the thoracic spine. Negative chest radiograph.           EKG:    ECG results from 08/29/23   ECG 12-LEAD WITH MUSE (L[facility])     Value    Systolic Blood Pressure 180    Diastolic Blood Pressure 88    Ventricular Rate 74    Atrial Rate 74    NE Interval 128    QRS Duration 76        QTc 463    P Axis 49    R AXIS 27    T Axis 41    Interpretation ECG      Sinus rhythm  Low voltage QRS  Cannot rule out Anterior infarct , age undetermined  Abnormal ECG  No previous ECGs available  Confirmed by SEE ED PROVIDER NOTE FOR, ECG INTERPRETATION ([id number]),  JULIO CÉSAR SAVAGE ([id number]) on 9/1/2023 12:56:07 AM       I have independently reviewed and interpreted the EKG(s) documented  above.    Performed at: 2:02 PM  Impression: Sinus rhythm   Rate: 79 BPM  Rhythm: Sinus rhythm  Axis: -38  NH Interval: 150 ms  QRS Interval: 72 ms  QTc Interval: 465 ms  ST Changes: None  Comparison: When compared with ECG of 29-AUG-2023, QRS axis shifted left.     I have independently reviewed and interpreted the EKG(s) documented above.      MEDICATIONS GIVEN IN THE EMERGENCY:  Medications   sodium chloride 0.9% BOLUS 1,000 mL (0 mLs Intravenous Stopped 12/29/23 1728)   cefdinir (OMNICEF) capsule 300 mg (300 mg Oral $Given 12/29/23 1648)           NEW PRESCRIPTIONS STARTED AT TODAY'S ER VISIT:  Discharge Medication List as of 12/29/2023  5:51 PM        START taking these medications    Details   cefdinir (OMNICEF) 300 MG capsule Take 1 capsule (300 mg) by mouth 2 times daily for 7 days, Disp-14 capsule, R-0, Local Print                      FINAL DIAGNOSIS:    ICD-10-CM    1. Encephalopathy  G93.40                  NAME: Ruthie Aaron  AGE: 68 year old female  YOB: 1955  MRN: [id number]  EVALUATION DATE & TIME: No admission date for patient encounter.    PCP: Soni Gil    ED PROVIDER: ADRIÁN Medley, Fifi Calix, am serving as a scribe to document services personally performed by Dr. Adriano Badillo based on my observation and the provider's statements to me. I, Adriano Badillo MD attest that Fifi Calix is acting in a scribe capacity, has observed my performance of the services and has documented them in accordance with my direction.    Adriano Badillo M.D.  Emergency Medicine  [facility] EMERGENCY DEPARTMENT  [facility] [facility] [address]  [phone]  Dept: [phone]  12/29/2023     Adriano Badillo MD  12/29/23 [id number]

## 2023-12-29 NOTE — ED TRIAGE NOTES
Patient brought in by her  who lives and takes care of her.  Pt has MS.  The past few days she has been having periods of lethargy where is does not respond appropriately.  Currently is awake.  This has happened in the past and found to have a UTI. Patient states it does have urgency with voiding--chronic low back pain.

## 2023-12-29 NOTE — DISCHARGE INSTRUCTIONS
You were treated in the hospital for dehydration and a urinary tract infection  Antibiotic prescribed treatment

## 2023-12-31 NOTE — PROGRESS NOTES
DISCHARGE  Reason for Discharge: Patient has failed to schedule further appointments.    Equipment Issued: none    Discharge Plan: N/A    Referring Provider:  Samantha Humphrey MD       11/20/23 0500   Appointment Info   Treating Provider Alissa Gamez, OTR/L   Visits Used 2   Medical Diagnosis MS (multiple sclerosis) (H) (G35)    Memory change (R41.3)   OT Tx Diagnosis impaired ADL and IADL's, change in cognition   Progress Note/Certification   Start Of Care Date 10/27/23   Onset of Illness/Injury or Date of Surgery 09/06/23  (date of referral to OT)   Therapy Frequency once a week   Predicted Duration 12 weeks   Certification date from 10/27/23   Certification date to 01/19/24   KX Modifier Statement I certify the need for these services furnished under this plan of treatment and while under my care.  (Physician co-signature of this document indicates review and certification of the therapy plan)   Progress Note Due Date 12/26/23   Progress Note Completed Date 10/27/23   OT Goal 1   Goal Identifier memory   Goal Description Patient to utilize memory tools (planner, calendar, etc.) effectively and independently for increased ability to manage time, appointments, daily schedule, etc.   Target Date 01/19/24   OT Goal 2   Goal Identifier safety   Goal Description Patient to respond to home safety/judgment scenarios with 90% accuracy or greater for ability to stay home alone for longer periods of time while spouse is away.   Target Date 01/19/23   OT Goal 3   Goal Identifier energy conservation   Goal Description Patient to state 2 strategies for energy conservation for improved independence with ADL/IADLs   Target Date 01/19/23   OT Goal 4   Goal Identifier AE   Goal Description Patient will consider AE to increase safety and independence with ADL's   Target Date 01/19/23   Self Care/Home Management   Self-Care/Home Mgmt/ADL, Compensatory, Meal Prep Minutes (04129) 40 Minutes   Self Care 1 AE, cognitive  activities   Self Care 1 - Details The Alpesh Cognitive Assessment (MoCA) was designed as a rapid screening instrument for mild cognitive dysfunction. It assesses different cognitive domains: attention and concentration, executive functions, memory, language, visuoconstructional skills, conceptual thinking, calculations, and orientation. The total possible score is 30 points; a score of 26 or above is considered normal. Patient scored 9/30 with deficits observed in visuospatial(-4), attention(-5), language fluency(-2), abstraction(-1), delayed recall (0/5 recalled after delay) and orientation(-4).  Patient and her  were educated on results and verbalized understanding. Patient's  asking about giving exercise to get her memory back. Patient and family education related to the use of strategies rather than exercises as memory is not likely to improve.  takes care of the finances and administers medications to Ruthie. He reports that she does have difficulty remembering where the bedroom is located when going to bed. Instructed on use of visual cues such as a sign with and Arrow indicating the direction to the bedroom. Also instructed on benefit of a illuminated clock that shows date, time of day and time to improve orientation. Ruthie expresses desire to find a way to remember to drink her water. Suggested they use a colorful water mug. Patient does no thave many hobbies and suggested adult coloring book, good housekeeping magazine as patient enjoyed baking and crafts in the past, puzzles etc. As far as dressing,  states that Ruthie requires 1:1 assist to cue and get clothing. Recommend they get out clothing the night before and set them near the chair she will use for dressing. Will need to set them out in the same order consistently with the goal of cueing her to get dressed so she can be independent from there.   Skilled Intervention instruct on AE and cognitive activities   Education    Learner/Method Patient;Significant Other   Plan   Plan for next session basic energy conservation principles, con't memory strategies. sent message to PCP (after see cognitive level) to see if could connect patient/refer for possible PCA hours?, MS society etc to have help the one day a week that her  is gone.(safety questions), consider lifeline or similar   Total Session Time   Timed Code Treatment Minutes 40   Total Treatment Time (sum of timed and untimed services) 40

## 2024-01-01 ENCOUNTER — TELEPHONE (OUTPATIENT)
Dept: EMERGENCY MEDICINE | Facility: HOSPITAL | Age: 69
End: 2024-01-01
Payer: MEDICARE

## 2024-01-01 LAB
BACTERIA UR CULT: ABNORMAL
BACTERIA UR CULT: ABNORMAL

## 2024-01-01 NOTE — TELEPHONE ENCOUNTER
[facility]    Reason for call: Lab Result Notification     Lab Result (including Rx patient on, if applicable).  If culture, copy of lab report at bottom.  Lab Result: Final Urine Culture Report on 1/1/24  [facility] Emergency Dept discharge antibiotic prescribed: Cefdinir (Omnicef) 300 mg capsule, 1 capsule (300 mg) by mouth 2 times daily for 7 days  #1. Bacteria, >100,000 CFU/ML Escherichia coli is SUSCEPTIBLE to Antibiotic.    #2. Bacteria, >100,000 CFU/ML Escherichia coli is SUSCEPTIBLE to Antibiotic.  No change in treatment per [facility] ED lab result Urine Culture protocol.    Creatinine Level (mg/dl)   Creatinine   Date Value Ref Range Status   12/29/2023 0.64 0.51 - 0.95 mg/dL Final    Creatinine clearance (ml/min), if applicable Serum creatinine: 0.64 mg/dL 12/29/23 1416  Estimated creatinine clearance: 72.7 mL/min     Patient's current Symptoms:   Left voicemail message requesting a call back to [facility] ED Lab Result RN at [phone].  RN is available every day between 9 a.m. and 5:30 p.m.      RN Recommendations/Instructions per [place] ED lab result protocol:   [facility] ED lab result protocol utilized: Urine Cx  was not notified of lab result.       Rakesh Benton RN

## 2024-01-04 LAB
ATRIAL RATE - MUSE: 79 BPM
DIASTOLIC BLOOD PRESSURE - MUSE: NORMAL MMHG
INTERPRETATION ECG - MUSE: NORMAL
P AXIS - MUSE: 51 DEGREES
PR INTERVAL - MUSE: 150 MS
QRS DURATION - MUSE: 72 MS
QT - MUSE: 406 MS
QTC - MUSE: 465 MS
R AXIS - MUSE: -38 DEGREES
SYSTOLIC BLOOD PRESSURE - MUSE: NORMAL MMHG
T AXIS - MUSE: 54 DEGREES
VENTRICULAR RATE- MUSE: 79 BPM

## 2024-01-10 ENCOUNTER — LAB (OUTPATIENT)
Dept: LAB | Facility: CLINIC | Age: 69
End: 2024-01-10
Payer: MEDICARE

## 2024-01-10 ENCOUNTER — OFFICE VISIT (OUTPATIENT)
Dept: NEUROLOGY | Facility: CLINIC | Age: 69
End: 2024-01-10
Attending: PSYCHIATRY & NEUROLOGY
Payer: MEDICARE

## 2024-01-10 VITALS — DIASTOLIC BLOOD PRESSURE: 81 MMHG | OXYGEN SATURATION: 96 % | SYSTOLIC BLOOD PRESSURE: 129 MMHG | HEART RATE: 75 BPM

## 2024-01-10 DIAGNOSIS — F03.B0 MODERATE DEMENTIA, UNSPECIFIED DEMENTIA TYPE, UNSPECIFIED WHETHER BEHAVIORAL, PSYCHOTIC, OR MOOD DISTURBANCE OR ANXIETY (H): ICD-10-CM

## 2024-01-10 DIAGNOSIS — N39.0 RECURRENT UTI: ICD-10-CM

## 2024-01-10 DIAGNOSIS — R41.89 SPELL OF ALTERED COGNITION: ICD-10-CM

## 2024-01-10 DIAGNOSIS — G82.20 PARAPARESIS (H): ICD-10-CM

## 2024-01-10 DIAGNOSIS — G35 MS (MULTIPLE SCLEROSIS) (H): Primary | ICD-10-CM

## 2024-01-10 DIAGNOSIS — R26.81 GAIT INSTABILITY: ICD-10-CM

## 2024-01-10 DIAGNOSIS — R25.2 SPASTICITY: ICD-10-CM

## 2024-01-10 LAB
ALBUMIN UR-MCNC: NEGATIVE MG/DL
APPEARANCE UR: ABNORMAL
BACTERIA #/AREA URNS HPF: ABNORMAL /HPF
BILIRUB UR QL STRIP: NEGATIVE
COLOR UR AUTO: ABNORMAL
GLUCOSE UR STRIP-MCNC: NEGATIVE MG/DL
HGB UR QL STRIP: NEGATIVE
KETONES UR STRIP-MCNC: NEGATIVE MG/DL
LEUKOCYTE ESTERASE UR QL STRIP: ABNORMAL
MUCOUS THREADS #/AREA URNS LPF: PRESENT /LPF
NITRATE UR QL: POSITIVE
PH UR STRIP: 5 [PH] (ref 5–7)
RBC URINE: <1 /HPF
SP GR UR STRIP: 1.02 (ref 1–1.03)
SQUAMOUS EPITHELIAL: 6 /HPF
TRANSITIONAL EPI: <1 /HPF
UROBILINOGEN UR STRIP-MCNC: NORMAL MG/DL
WBC URINE: 3 /HPF

## 2024-01-10 PROCEDURE — 87186 SC STD MICRODIL/AGAR DIL: CPT | Mod: 59 | Performed by: PSYCHIATRY & NEUROLOGY

## 2024-01-10 PROCEDURE — G0463 HOSPITAL OUTPT CLINIC VISIT: HCPCS | Performed by: PSYCHIATRY & NEUROLOGY

## 2024-01-10 PROCEDURE — 99215 OFFICE O/P EST HI 40 MIN: CPT | Performed by: PSYCHIATRY & NEUROLOGY

## 2024-01-10 PROCEDURE — 87086 URINE CULTURE/COLONY COUNT: CPT | Performed by: PSYCHIATRY & NEUROLOGY

## 2024-01-10 PROCEDURE — 81001 URINALYSIS AUTO W/SCOPE: CPT | Performed by: PATHOLOGY

## 2024-01-10 PROCEDURE — 99000 SPECIMEN HANDLING OFFICE-LAB: CPT | Performed by: PATHOLOGY

## 2024-01-10 RX ORDER — DONEPEZIL HYDROCHLORIDE 10 MG/1
10 TABLET, FILM COATED ORAL DAILY
Qty: 30 TABLET | Refills: 11 | Status: SHIPPED | OUTPATIENT
Start: 2024-02-05

## 2024-01-10 RX ORDER — DONEPEZIL HYDROCHLORIDE 5 MG/1
5 TABLET, FILM COATED ORAL DAILY
Qty: 30 TABLET | Refills: 0 | Status: SHIPPED | OUTPATIENT
Start: 2024-01-10 | End: 2024-05-15

## 2024-01-10 ASSESSMENT — PAIN SCALES - GENERAL: PAINLEVEL: MILD PAIN (2)

## 2024-01-10 NOTE — PATIENT INSTRUCTIONS
1) memory decline   Can occur more quickly if there are seizures going on   - stop dalfampridine as this medication increases the risk of seizures   - eeg to check for seizures  - try donepezil for memory, but stop if appetite reduces or if it causes significant diarrhea   - I will reach out to Dr. Rhodes about ordering a blood test to check for alzheimer's as a contributing factor    2) urinary infections   - check urine today   - start a cranberry supplement (over the counter)   - reach out to me if you notice some somnolence or confusion and are concerned about the beginning of an infection    3) stiffness   - referral to home therapy     Follow up in 4 months

## 2024-01-10 NOTE — TELEPHONE ENCOUNTER
[facility] Emergency Department/Urgent Care Lab result notification  [Note:  ED Lab Results RN will reference the [facility] Emergency Dept visit note prior to contacting patient AND/OR prior to consulting Emergency Dept Provider.  Highlights of Emergency Dept visit in information summary at the bottom of this telephone note]    1. Reason for call  Notify of lab results  Assess patient symptoms [if necessary]  Review ED Providers recommendations/discharge instructions (if necessary)  Advise per [facility] ED lab result protocol    2. Lab Result (including Rx patient on, if applicable).  If culture, copy of lab report at bottom.  Final Urine Culture Report on 1/1/24  [facility] Emergency Dept discharge antibiotic prescribed: Cefdinir (Omnicef) 300 mg capsule, 1 capsule (300 mg) by mouth 2 times daily for 7 days  #1. Bacteria, >100,000 CFU/ML Escherichia coli is SUSCEPTIBLE to Antibiotic.    #2. Bacteria, >100,000 CFU/ML Escherichia coli is SUSCEPTIBLE to Antibiotic.  No change in treatment per [facility] ED lab result Urine Culture protocol.    3. RN Assessment (Patient's current Symptoms):  Time of call: 1/10/2024 3:47 PM  Assessment: Patient  returned call no consent on file, gets patient on the line, she says she has followed up with PCP and had retest of urine today, she declined to give a response when asking how she was doing after ED even though she was asked several times  Genitourinary symptoms: declined to give a response  Fever:  declined to respond    4. RN Recommendations/Instructions per [place] ED lab result protocol  [facility] ED lab result protocol used: Urine culture  Patient was notified of lab result and treatment recommendations  RN reviewed information about following recommendations from PCP if they have followed up and retested urine, return for any worsening fevers, flank pain, nausea/vomiting. Patient verbalized understanding and agrees with  plan.      5. Please Contact your PCP clinic or return to the Emergency department if your:  Symptoms return.  Symptoms do not improve after 3 days on antibiotic.  Symptoms do not resolve after completing antibiotic.  Symptoms worsen or other concerning symptoms.      Yenifer Bell RN  [facility]  Emergency Dept Lab Result RN  Ph# [phone]

## 2024-01-10 NOTE — NURSING NOTE
Chief Complaint   Patient presents with    MS    RECHECK     4 month follow up     Vitals were taken and medications were reconciled.   Toby Magana, EMT  11:08 AM

## 2024-01-10 NOTE — LETTER
1/10/2024       RE: Ruthie Aaron  [address] [address]     Dear Colleague,    Thank you for referring your patient, Ruthie Aaron, to the [facility]LE [facility] [place] at [facility]. Please see a copy of my visit note below.    Date of Service: 1/10/2024    [facility] Neurology   MS Clinic Evaluation     Subjective: 69 year old woman with HLD, osteoporosis, and multiple sclerosis who presents in follow up for MS     She is accompanied by her  Gilmer and son Armando. Daughter in law  Natabimael accompanies by phone.     There has been a considerable decline in memory.  She is now requiring assistance for all activities of daily living.  She is becoming incontinent of bladder and bowel.  She is wearing depends.  Her  is doing the home activities for her.    Since her last visit with me she has had 3 ER visits for urinary tract infection.  With each spell she becomes extremely somnolent/less responsive and has had hallucinations.  There is a rapid turnaround in function after she is given care in the hospital.  This is prompted concern for seizures.    She will have occasional staring spells with delayed responses to questions.    She continues to take dalfampridine and sertraline.  She has difficulty swallowing dalfampridine, so has been chewing this medication.    Her appetite has been variable.  When she is functioning well she will eat a lot, but when she is in a down spell, her appetite is very poor.    She has noted to be very stiff in the morning but when she gets up and moves around the stiffness reduces.    She has worked with occupational therapy.  MoCA was 9 out of 30.  She had significant impairments in visual-spatial skills as well as short-term memory.  Recall that there is a positive family history of late onset dementia.    Disease onset: Age 45 when she experienced tripping over her right foot and  spasms  Progression onset: Mid 50s, manifesting with a right foot drop much more than the left    DMD history:   Avonex [id number] flulike side effects, injection intolerance  Copaxone [id number], discontinued due to lipoatrophy  Tecfidera 2017 for 2 months, discontinued due to flushing        Allergies   Allergen Reactions    Sulfacetamide      Other reaction(s): hives       Current Outpatient Medications   Medication    [START ON 2/5/2024] donepezil (ARICEPT) 10 MG tablet    donepezil (ARICEPT) 5 MG tablet    modafinil (PROVIGIL) 100 MG tablet    sertraline (ZOLOFT) 100 MG tablet     No current facility-administered medications for this visit.        Past medical, surgical, social and family history was personally reviewed. Pertinent details noted above.     Physical Examination:   /81 (BP Location: Right arm, Patient Position: Sitting, Cuff Size: Adult Regular)   Pulse 75   SpO2 96%     General: no acute distress  Poor recall of events from day prior, some anomia   Speech generally fluent   Able to follow multistep command  Cranial nerves:   VFFC  PERRL w/no RAPD  EOM full w/B/L GULSHAN   Face symmetric  Hearing intact  No dysarthria   Motor:   Tone is sl increased, slowed movements on left  Bulk is normal     R L  Deltoid  5 5  Biceps  5 5  Triceps 5 5  Wrist ext 5 5  Finger ext 5 5  Finger abd 5 5    Hip flexion 3 4+  Knee flexion 4 5  Knee ext 4 5  Ankle d/f 4 5-    Coordination: no ataxia or dysmetria  Gait: wide based spastic paraparetic gait      Tests/Imaging:     D 41    MRI brain   2015 - mod sev lesion burden with predominantly pvl, but also few jcl and dwml, but no pf lesions  2022 - mild/mod growth of lesions, no distinct new lesions, none venkat enhancing  2023 - no change in lesions reviewed in clinic    MRI cervical spine   2022 - 3 cord lesions, lesion right lat cord at c3 faint gd+  2023 - no new lesions    MRI thoracic spine   2022 - few small lesions noted, gd-       Assessment: 69 year old  woman with secondary progressive multiple sclerosis who has been off disease modifying therapy and did have evidence of mildly active disease on an mri in 2022.  However, most recently she has suffered a rather significant decline in memory.      We discussed today how with the presence of small seizures could contribute to memory decline.  I recommended that she undergo an EEG.  She was also advised to stop taking dalfampridine as this lowers the seizure threshold.    Preventing infection certainly is important as well.  We will check a UA today to ensure that her most recent UTI has adequately cleared.  She was also encouraged to try cranberry supplement as this may decrease her risk for UTIs.  She is receiving appropriate assistance with hygienic care.    I am concerned for a secondary process that is contributing to her decline in memory.  The pattern of memory changes not consistent with what would be seen with multiple sclerosis.  There may be a secondary process such as Alzheimer's dementia.  I will reach out to my colleague to see if there is blood testing that can be performed.  I have also recommended she try donepezil.  Common risks and side effects were discussed.    I have placed a referral for home PT to aid with management of spasticity and safe gait.    Plan:     -Home PT referral  - Donepezil trial  - Stop dalfampridine that skilled  - EEG  - Will consider blood testing for Alzheimer's  - Follow-up in 4 months    Note was completed with the assistance of Dragon Fluency software which can often result in accidental word substitutions.     A total of 45 minutes on the date of service were spent in the care of this patient.   Samantha Humphrey MD on 1/10/2024 at 10:25 AM            Again, thank you for allowing me to participate in the care of your patient.      Sincerely,    Samantha Humphrey MD

## 2024-01-10 NOTE — PROGRESS NOTES
Date of Service: 1/10/2024    [facility] Neurology   MS Clinic Evaluation     Subjective: 69 year old woman with HLD, osteoporosis, and multiple sclerosis who presents in follow up for MS     She is accompanied by her  Gilmer and son Armando. Daughter in law  Agnieszka accompanies by phone.     There has been a considerable decline in memory.  She is now requiring assistance for all activities of daily living.  She is becoming incontinent of bladder and bowel.  She is wearing depends.  Her  is doing the home activities for her.    Since her last visit with me she has had 3 ER visits for urinary tract infection.  With each spell she becomes extremely somnolent/less responsive and has had hallucinations.  There is a rapid turnaround in function after she is given care in the hospital.  This is prompted concern for seizures.    She will have occasional staring spells with delayed responses to questions.    She continues to take dalfampridine and sertraline.  She has difficulty swallowing dalfampridine, so has been chewing this medication.    Her appetite has been variable.  When she is functioning well she will eat a lot, but when she is in a down spell, her appetite is very poor.    She has noted to be very stiff in the morning but when she gets up and moves around the stiffness reduces.    She has worked with occupational therapy.  MoCA was 9 out of 30.  She had significant impairments in visual-spatial skills as well as short-term memory.  Recall that there is a positive family history of late onset dementia.    Disease onset: Age 45 when she experienced tripping over her right foot and spasms  Progression onset: Mid 50s, manifesting with a right foot drop much more than the left    DMD history:   Avonex 5658-7224 flulike side effects, injection intolerance  Copaxone [id number], discontinued due to lipoatrophy  Tecfidera 2017 for 2 months, discontinued due to flushing        Allergies   Allergen Reactions     Sulfacetamide      Other reaction(s): hives       Current Outpatient Medications   Medication    [START ON 2/5/2024] donepezil (ARICEPT) 10 MG tablet    donepezil (ARICEPT) 5 MG tablet    modafinil (PROVIGIL) 100 MG tablet    sertraline (ZOLOFT) 100 MG tablet     No current facility-administered medications for this visit.        Past medical, surgical, social and family history was personally reviewed. Pertinent details noted above.     Physical Examination:   /81 (BP Location: Right arm, Patient Position: Sitting, Cuff Size: Adult Regular)   Pulse 75   SpO2 96%     General: no acute distress  Poor recall of events from day prior, some anomia   Speech generally fluent   Able to follow multistep command  Cranial nerves:   VFFC  PERRL w/no RAPD  EOM full w/B/L GULSHAN   Face symmetric  Hearing intact  No dysarthria   Motor:   Tone is sl increased, slowed movements on left  Bulk is normal     R L  Deltoid  5 5  Biceps  5 5  Triceps 5 5  Wrist ext 5 5  Finger ext 5 5  Finger abd 5 5    Hip flexion 3 4+  Knee flexion 4 5  Knee ext 4 5  Ankle d/f 4 5-    Coordination: no ataxia or dysmetria  Gait: wide based spastic paraparetic gait      Tests/Imaging:     D 41    MRI brain   2015 - mod sev lesion burden with predominantly pvl, but also few jcl and dwml, but no pf lesions  2022 - mild/mod growth of lesions, no distinct new lesions, none venkat enhancing  2023 - no change in lesions reviewed in clinic    MRI cervical spine   2022 - 3 cord lesions, lesion right lat cord at c3 faint gd+  2023 - no new lesions    MRI thoracic spine   2022 - few small lesions noted, gd-       Assessment: 69 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  However, most recently she has suffered a rather significant decline in memory.      We discussed today how with the presence of small seizures could contribute to  memory decline.  I recommended that she undergo an EEG.  She was also advised to stop taking dalfampridine as this lowers the seizure threshold.    Preventing infection certainly is important as well.  We will check a UA today to ensure that her most recent UTI has adequately cleared.  She was also encouraged to try cranberry supplement as this may decrease her risk for UTIs.  She is receiving appropriate assistance with hygienic care.    I am concerned for a secondary process that is contributing to her decline in memory.  The pattern of memory changes not consistent with what would be seen with multiple sclerosis.  There may be a secondary process such as Alzheimer's dementia.  I will reach out to my colleague to see if there is blood testing that can be performed.  I have also recommended she try donepezil.  Common risks and side effects were discussed.    I have placed a referral for home PT to aid with management of spasticity and safe gait.    Plan:     -Home PT referral  - Donepezil trial  - Stop dalfampridine that skilled  - EEG  - Will consider blood testing for Alzheimer's  - Follow-up in 4 months    Note was completed with the assistance of Dragon Fluency software which can often result in accidental word substitutions.     A total of 45 minutes on the date of service were spent in the care of this patient.   Samantha Humphrey MD on 1/10/2024 at 10:25 AM

## 2024-01-12 ENCOUNTER — TELEPHONE (OUTPATIENT)
Dept: NEUROLOGY | Facility: CLINIC | Age: 69
End: 2024-01-12
Payer: MEDICARE

## 2024-01-12 LAB
BACTERIA UR CULT: ABNORMAL
BACTERIA UR CULT: ABNORMAL

## 2024-01-12 NOTE — TELEPHONE ENCOUNTER
Received clarification request form [facility] regarding donepezil dosing. Spoke with pharmacist at [facility] and clarified that pt is to start on the 5 mg dose. After one month on 5 mg, will go to the 10 mg dose.     Madelaine Schmidt RN

## 2024-01-24 ENCOUNTER — DOCUMENTATION ONLY (OUTPATIENT)
Dept: NEUROLOGY | Facility: CLINIC | Age: 69
End: 2024-01-24
Payer: MEDICARE

## 2024-01-24 NOTE — PROGRESS NOTES
Home Health Certification and Plan of Care has been received from [facility], orders placed in Dr. Humphrey's folder for review and signature.   Toby Magana EMT 01/24/2024 9:38AM

## 2024-01-25 ENCOUNTER — ANCILLARY PROCEDURE (OUTPATIENT)
Dept: NEUROLOGY | Facility: CLINIC | Age: 69
End: 2024-01-25
Attending: PSYCHIATRY & NEUROLOGY
Payer: MEDICARE

## 2024-01-25 DIAGNOSIS — F03.B0 MODERATE DEMENTIA, UNSPECIFIED DEMENTIA TYPE, UNSPECIFIED WHETHER BEHAVIORAL, PSYCHOTIC, OR MOOD DISTURBANCE OR ANXIETY (H): ICD-10-CM

## 2024-01-25 DIAGNOSIS — G35 MS (MULTIPLE SCLEROSIS) (H): ICD-10-CM

## 2024-01-25 DIAGNOSIS — R41.89 SPELL OF ALTERED COGNITION: ICD-10-CM

## 2024-01-25 PROCEDURE — 95816 EEG AWAKE AND DROWSY: CPT | Performed by: PSYCHIATRY & NEUROLOGY

## 2024-01-30 DIAGNOSIS — Z53.9 DIAGNOSIS NOT YET DEFINED: Primary | ICD-10-CM

## 2024-01-30 PROCEDURE — G0180 MD CERTIFICATION HHA PATIENT: HCPCS | Performed by: PSYCHIATRY & NEUROLOGY

## 2024-01-30 NOTE — PROGRESS NOTES
Home Health Certification and Plan of Care has been signed and faxed back at [phone].  Toby Magana EMT 01/30/2024 3:06PM

## 2024-02-05 ENCOUNTER — MEDICAL CORRESPONDENCE (OUTPATIENT)
Dept: HEALTH INFORMATION MANAGEMENT | Facility: CLINIC | Age: 69
End: 2024-02-05
Payer: MEDICARE

## 2024-02-06 NOTE — RESULT ENCOUNTER NOTE
Please noitfy patient's  that EEG was negative for seizure activity. This does not completely rule out seizures. If they she has any spells of significant confusion without an infection, then they should notify me. Samantha Humphrey MD on 2/5/2024 at 9:33 PM

## 2024-02-13 ENCOUNTER — LAB (OUTPATIENT)
Dept: LAB | Facility: CLINIC | Age: 69
End: 2024-02-13
Payer: MEDICARE

## 2024-02-13 DIAGNOSIS — N39.0 RECURRENT UTI: ICD-10-CM

## 2024-02-13 DIAGNOSIS — R41.89 SPELL OF ALTERED COGNITION: ICD-10-CM

## 2024-02-13 LAB
ALBUMIN UR-MCNC: NEGATIVE MG/DL
APPEARANCE UR: CLEAR
BACTERIA #/AREA URNS HPF: ABNORMAL /HPF
BILIRUB UR QL STRIP: NEGATIVE
COLOR UR AUTO: ABNORMAL
GLUCOSE UR STRIP-MCNC: NEGATIVE MG/DL
HGB UR QL STRIP: NEGATIVE
KETONES UR STRIP-MCNC: NEGATIVE MG/DL
LEUKOCYTE ESTERASE UR QL STRIP: NEGATIVE
MUCOUS THREADS #/AREA URNS LPF: PRESENT /LPF
NITRATE UR QL: NEGATIVE
PH UR STRIP: 5.5 [PH] (ref 5–7)
RBC URINE: 1 /HPF
SP GR UR STRIP: 1.02 (ref 1–1.03)
SQUAMOUS EPITHELIAL: 3 /HPF
UROBILINOGEN UR STRIP-MCNC: NORMAL MG/DL
WBC URINE: <1 /HPF

## 2024-02-13 PROCEDURE — 99000 SPECIMEN HANDLING OFFICE-LAB: CPT | Performed by: PATHOLOGY

## 2024-02-13 PROCEDURE — 81001 URINALYSIS AUTO W/SCOPE: CPT | Performed by: PATHOLOGY

## 2024-02-13 PROCEDURE — 87086 URINE CULTURE/COLONY COUNT: CPT | Performed by: PSYCHIATRY & NEUROLOGY

## 2024-02-14 LAB — BACTERIA UR CULT: NORMAL

## 2024-03-14 ENCOUNTER — MEDICAL CORRESPONDENCE (OUTPATIENT)
Dept: HEALTH INFORMATION MANAGEMENT | Facility: CLINIC | Age: 69
End: 2024-03-14
Payer: MEDICARE

## 2024-03-18 ENCOUNTER — TELEPHONE (OUTPATIENT)
Dept: NEUROLOGY | Facility: CLINIC | Age: 69
End: 2024-03-18
Payer: MEDICARE

## 2024-03-18 NOTE — TELEPHONE ENCOUNTER
Left Voicemail (1st Attempt) and Sent Mychart (1st Attempt) for the patient to call back and schedule the following:    Appointment type: Follow up  Provider: Dr. Humphrey   Return date: May  Specialty phone number: [phone]  Additional appointment(s) needed:   Additonal Notes: please resched appt currently scheduled on 5/14/24 BD

## 2024-03-26 ENCOUNTER — TELEPHONE (OUTPATIENT)
Dept: NEUROLOGY | Facility: CLINIC | Age: 69
End: 2024-03-26
Payer: MEDICARE

## 2024-03-26 ENCOUNTER — DOCUMENTATION ONLY (OUTPATIENT)
Dept: NEUROLOGY | Facility: CLINIC | Age: 69
End: 2024-03-26
Payer: MEDICARE

## 2024-03-26 NOTE — PROGRESS NOTES
Home Health Certification and Plan of Care from [facility] has been signed and faxed back to [phone].          Forms received as the first 2 pages were not originally sent. Put in Dr. Humphrey's folder to sign.

## 2024-03-26 NOTE — TELEPHONE ENCOUNTER
[facility] Call Center    Phone Message    May a detailed message be left on voicemail: yes     Reason for Call:      Leonor at [facility] called states that home health satisfaction plan of care form they received are missing pages 1, 2 + cover sheet. They only received pages 3-6.    Leonor will refax new forms to clinic to fill out as per their policy, please be on the look out for forms. Order number [id number] // Fax back number is [phone]     Action Taken: Message routed to:  [facility] ([facility] neurology    Travel Screening: Not Applicable

## 2024-03-28 DIAGNOSIS — Z53.9 DIAGNOSIS NOT YET DEFINED: Primary | ICD-10-CM

## 2024-03-28 PROCEDURE — G0179 MD RECERTIFICATION HHA PT: HCPCS | Performed by: PSYCHIATRY & NEUROLOGY

## 2024-03-28 NOTE — TELEPHONE ENCOUNTER
Missing pages signed and faxed back to [facility]    Jackie Riley on 3/28/2024 at 3:14 PM

## 2024-05-15 ENCOUNTER — OFFICE VISIT (OUTPATIENT)
Dept: NEUROLOGY | Facility: CLINIC | Age: 69
End: 2024-05-15
Attending: PSYCHIATRY & NEUROLOGY
Payer: MEDICARE

## 2024-05-15 VITALS — HEART RATE: 74 BPM | DIASTOLIC BLOOD PRESSURE: 79 MMHG | OXYGEN SATURATION: 96 % | SYSTOLIC BLOOD PRESSURE: 129 MMHG

## 2024-05-15 DIAGNOSIS — G35 MS (MULTIPLE SCLEROSIS) (H): Primary | ICD-10-CM

## 2024-05-15 DIAGNOSIS — F03.B0 MODERATE DEMENTIA, UNSPECIFIED DEMENTIA TYPE, UNSPECIFIED WHETHER BEHAVIORAL, PSYCHOTIC, OR MOOD DISTURBANCE OR ANXIETY (H): ICD-10-CM

## 2024-05-15 PROCEDURE — 99215 OFFICE O/P EST HI 40 MIN: CPT | Performed by: PSYCHIATRY & NEUROLOGY

## 2024-05-15 PROCEDURE — G0463 HOSPITAL OUTPT CLINIC VISIT: HCPCS | Performed by: PSYCHIATRY & NEUROLOGY

## 2024-05-15 ASSESSMENT — PAIN SCALES - GENERAL: PAINLEVEL: NO PAIN (0)

## 2024-05-15 NOTE — NURSING NOTE
Chief Complaint   Patient presents with    MS    RECHECK     4 month follow up      Vitals were taken and medications were reconciled.   Toby Magana, EMT  9:54 AM

## 2024-05-15 NOTE — LETTER
5/15/2024       RE: Ruthie Aaron  [address] [facility] [address]     Dear Colleague,    Thank you for referring your patient, Ruthie Aaron, to the [facility] [place] at [facility]. Please see a copy of my visit note below.    Date of Service: 5/15/2024    [facility] Neurology   MS Clinic Evaluation     Subjective: 69 year old woman with HLD, osteoporosis, and multiple sclerosis who presents in follow up for MS     She is accompanied by her  Gilmer and son Armando.     Since her last visit with me she has not experienced any discrete new symptoms.  She did work with home PT and OT.  She did experience improvement in strength.  She is doing her exercises routinely.  Occupational Therapy gave her strength exercises for her upper extremities.    She does rely on a calendar.  She seems to be doing better from a cognitive standpoint.  She is more interactive with her grandchildren.  There have been episodes where she is unsupervised for a brief period of time and she will get up and wander.  She is no longer engaging/utilizing the phone or electronics.    Gait is quite variable.  On a good day she can walk 150 feet, but on a bad day she can only walk 50 feet with the assistance of another person.  They inquire about a wheelchair.  I think that this is reasonable as she has difficulty ambulating around the home on her bad days.  This affects her ability to do routine activities such as get to the bathroom safely, or get to the kitchen for food.    She has not struggled with recurrence of hallucinations since her last visit aside from a brief episode in February.  He had a UA done at that time and it was negative for UTI.  She has been taking a cranberry supplement, which seems to have been helpful.    She reports that her appetite is strong.  She is eating more variety of foods.    She continues to have some stiffness in the  morning, but this improves with movement.    Disease onset: Age 45 when she experienced tripping over her right foot and spasms  Progression onset: Mid 50s, manifesting with a right foot drop much more than the left    DMD history:   Avonex 3501-2371 flulike side effects, injection intolerance  Copaxone [id number], discontinued due to lipoatrophy  Tecfidera 2017 for 2 months, discontinued due to flushing        Allergies   Allergen Reactions    Sulfacetamide      Other reaction(s): hives       Current Outpatient Medications   Medication Sig Dispense Refill    ciprofloxacin (CIPRO) 500 MG tablet Take 1 tablet (500 mg) by mouth 2 times daily 20 tablet 0    donepezil (ARICEPT) 10 MG tablet Take 1 tablet (10 mg) by mouth daily 30 tablet 11    sertraline (ZOLOFT) 100 MG tablet Take 1 tablet (100 mg) by mouth daily 90 tablet 3     No current facility-administered medications for this visit.        Past medical, surgical, social and family history was personally reviewed. Pertinent details noted above.     Physical Examination:   /79 (BP Location: Right arm, Patient Position: Sitting, Cuff Size: Adult Regular)   Pulse 74   SpO2 96%     General: no acute distress  She is disoriented to location ([facility]), year (1955), month (January)  Speech generally fluent   Able to follow multistep command  Cranial nerves:   VF are grossly full, but she does not reliably count fingers correctly  PERRL w/no RAPD  EOM difficult to assess as she is not tracking well  Face symmetric  Hearing intact  No dysarthria   Motor:   Tone is sl increased, no cogwheel rigidity  Bulk is normal     R L  Deltoid  5 5  Biceps  5 5  Triceps 5 5  Wrist ext 5 5  Finger ext 5 5  Finger abd 5 5    Hip flexion 3 4+  Knee flexion 4+ 5  Knee ext 4+ 5  Ankle d/f 4+ 5-  Sensation: intact to vibration at the ankles  Coordination: no ataxia or dysmetria  Gait: wide based spastic paraparetic gait with right > left foot drop       Tests/Imaging:     D  41    MRI brain   2015 - mod sev lesion burden with predominantly pvl, but also few jcl and dwml, but no pf lesions  2022 - mild/mod growth of lesions, no distinct new lesions, none venkat enhancing  2023 - no change in lesions reviewed in clinic    MRI cervical spine   2022 - 3 cord lesions, lesion right lat cord at c3 faint gd+  2023 - no new lesions    MRI thoracic spine   2022 - few small lesions noted, gd-       Assessment: 69 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  However, most recently she has suffered a rather significant decline in memory.      Decline in memory is more in keeping with Alzheimer's dementia.  She does have a positive family history of late onset dementia.  I have recommended she undergo blood testing for Alzheimer's dementia.    A home safety evaluation would be particularly beneficial.  Referral was placed today.    I also think that she would benefit from a lightweight wheelchair.  This would aid her in ambulation on days where she is not ambulating as well.  It would be adequately effective in preventing falls.  It would also assist in her ability to do activities of daily living on days where she is not ambulating well.    Commended that she continue with donepezil as it seems that this has been beneficial for her.  She should also continue sertraline.  She is encouraged to remain socially and physically active.    Plan:     -Visit with memory specialist to aid in management of memory decline  - Continue donepezil  - Blood test today -- ad-detect from [facility] if patient agrees to cost  - Weight wheelchair  - Follow-up in 6 months    Note was completed with the assistance of Dragon Fluency software which can often result in accidental word substitutions.     A total of 40 minutes on the date of service were spent in the care of this patient.   Samantha Wick  MD Kam on 5/15/2024 at 10:04 AM            DME (Durable Medical Equipment) Orders and Documentation  Orders Placed This Encounter   Procedures    Wheelchair Order for DME - ONLY FOR DME      The patient was assessed and it was determined the patient is in need of the following listed DME Supplies/Equipment. Please complete supporting documentation below to demonstrate medical necessity.      Wheelchair Documentation  1. The patient has mobility limitations that impairs their ability to participate in one or more mobility related activities: Toileting, Feeding, Grooming, and Bathing.  2. The patient's mobility limitations cannot be safely resolved by using a cane/walker: patient has fluctuating symptoms, there are times where a cane is inadequate for ambulation and fall prevention  3. The patients home has adequate access to use a manual wheelchair:Yes  4. The use of a manual wheelchair on a regular basis will improve the patients ability to participate in mobility related ADL's at home:Yes  5. The patient is willing to use a manual wheelchair at home:Yes  6. The patient has adequate upper body strength and the mental capability to safely use a manual wheelchair and/or has a caregiver that is able to assist: Yes  7. Does the patient have a lower extremity injury or edema?No    Reason for Type of Wheelchair  Patient weight: 0 lbs 0 oz  Light Weight Wheelchair: Patient is unable to self-propel a standard wheelchair in the home but can self propel a light weight wheelchair.    **Use of a manual wheelchair will significantly improve the patient's ability to participate in MRADLs and the patient will use it on a regular basis in the home. The patient has not expressed an unwillingness to use the manual wheelchair that is provided in the home.**             Again, thank you for allowing me to participate in the care of your patient.      Sincerely,    Samantha Humphrey MD

## 2024-05-15 NOTE — PROGRESS NOTES
Date of Service: 5/15/2024    [facility] Neurology   MS Clinic Evaluation     Subjective: 69 year old woman with HLD, osteoporosis, and multiple sclerosis who presents in follow up for MS     She is accompanied by her  Gilmer and son Armando.     Since her last visit with me she has not experienced any discrete new symptoms.  She did work with home PT and OT.  She did experience improvement in strength.  She is doing her exercises routinely.  Occupational Therapy gave her strength exercises for her upper extremities.    She does rely on a calendar.  She seems to be doing better from a cognitive standpoint.  She is more interactive with her grandchildren.  There have been episodes where she is unsupervised for a brief period of time and she will get up and wander.  She is no longer engaging/utilizing the phone or electronics.    Gait is quite variable.  On a good day she can walk 150 feet, but on a bad day she can only walk 50 feet with the assistance of another person.  They inquire about a wheelchair.  I think that this is reasonable as she has difficulty ambulating around the home on her bad days.  This affects her ability to do routine activities such as get to the bathroom safely, or get to the kitchen for food.    She has not struggled with recurrence of hallucinations since her last visit aside from a brief episode in February.  He had a UA done at that time and it was negative for UTI.  She has been taking a cranberry supplement, which seems to have been helpful.    She reports that her appetite is strong.  She is eating more variety of foods.    She continues to have some stiffness in the morning, but this improves with movement.    Disease onset: Age 45 when she experienced tripping over her right foot and spasms  Progression onset: Mid 50s, manifesting with a right foot drop much more than the left    DMD history:   Avonex [id number] flulike side effects, injection intolerance  Copaxone [id number],  discontinued due to lipoatrophy  Tecfidera 2017 for 2 months, discontinued due to flushing        Allergies   Allergen Reactions    Sulfacetamide      Other reaction(s): hives       Current Outpatient Medications   Medication Sig Dispense Refill    ciprofloxacin (CIPRO) 500 MG tablet Take 1 tablet (500 mg) by mouth 2 times daily 20 tablet 0    donepezil (ARICEPT) 10 MG tablet Take 1 tablet (10 mg) by mouth daily 30 tablet 11    sertraline (ZOLOFT) 100 MG tablet Take 1 tablet (100 mg) by mouth daily 90 tablet 3     No current facility-administered medications for this visit.        Past medical, surgical, social and family history was personally reviewed. Pertinent details noted above.     Physical Examination:   /79 (BP Location: Right arm, Patient Position: Sitting, Cuff Size: Adult Regular)   Pulse 74   SpO2 96%     General: no acute distress  She is disoriented to location ([facility]), year (1955), month (January)  Speech generally fluent   Able to follow multistep command  Cranial nerves:   VF are grossly full, but she does not reliably count fingers correctly  PERRL w/no RAPD  EOM difficult to assess as she is not tracking well  Face symmetric  Hearing intact  No dysarthria   Motor:   Tone is sl increased, no cogwheel rigidity  Bulk is normal     R L  Deltoid  5 5  Biceps  5 5  Triceps 5 5  Wrist ext 5 5  Finger ext 5 5  Finger abd 5 5    Hip flexion 3 4+  Knee flexion 4+ 5  Knee ext 4+ 5  Ankle d/f 4+ 5-  Sensation: intact to vibration at the ankles  Coordination: no ataxia or dysmetria  Gait: wide based spastic paraparetic gait with right > left foot drop       Tests/Imaging:     D 41    MRI brain   2015 - mod sev lesion burden with predominantly pvl, but also few jcl and dwml, but no pf lesions  2022 - mild/mod growth of lesions, no distinct new lesions, none venkat enhancing  2023 - no change in lesions reviewed in clinic    MRI cervical spine   2022 - 3 cord lesions, lesion right lat cord at c3  faint gd+  2023 - no new lesions    MRI thoracic spine   2022 - few small lesions noted, gd-       Assessment: 69 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  However, most recently she has suffered a rather significant decline in memory.      Decline in memory is more in keeping with Alzheimer's dementia.  She does have a positive family history of late onset dementia.  I have recommended she undergo blood testing for Alzheimer's dementia.    A home safety evaluation would be particularly beneficial.  Referral was placed today.    I also think that she would benefit from a lightweight wheelchair.  This would aid her in ambulation on days where she is not ambulating as well.  It would be adequately effective in preventing falls.  It would also assist in her ability to do activities of daily living on days where she is not ambulating well.    Commended that she continue with donepezil as it seems that this has been beneficial for her.  She should also continue sertraline.  She is encouraged to remain socially and physically active.    Plan:     -Visit with memory specialist to aid in management of memory decline  - Continue donepezil  - Blood test today -- ad-detect from [facility] if patient agrees to cost  - Weight wheelchair  - Follow-up in 6 months    Note was completed with the assistance of Dragon Fluency software which can often result in accidental word substitutions.     A total of 40 minutes on the date of service were spent in the care of this patient.   Samantha Humphrey MD on 5/15/2024 at 10:04 AM            DME (Durable Medical Equipment) Orders and Documentation  Orders Placed This Encounter   Procedures    Wheelchair Order for DME - ONLY FOR DME      The patient was assessed and it was determined the patient is in need of the following listed DME Supplies/Equipment. Please  complete supporting documentation below to demonstrate medical necessity.      Wheelchair Documentation  1. The patient has mobility limitations that impairs their ability to participate in one or more mobility related activities: Toileting, Feeding, Grooming, and Bathing.  2. The patient's mobility limitations cannot be safely resolved by using a cane/walker: patient has fluctuating symptoms, there are times where a cane is inadequate for ambulation and fall prevention  3. The patients home has adequate access to use a manual wheelchair:Yes  4. The use of a manual wheelchair on a regular basis will improve the patients ability to participate in mobility related ADL's at home:Yes  5. The patient is willing to use a manual wheelchair at home:Yes  6. The patient has adequate upper body strength and the mental capability to safely use a manual wheelchair and/or has a caregiver that is able to assist: Yes  7. Does the patient have a lower extremity injury or edema?No    Reason for Type of Wheelchair  Patient weight: 0 lbs 0 oz  Light Weight Wheelchair: Patient is unable to self-propel a standard wheelchair in the home but can self propel a light weight wheelchair.    **Use of a manual wheelchair will significantly improve the patient's ability to participate in MRADLs and the patient will use it on a regular basis in the home. The patient has not expressed an unwillingness to use the manual wheelchair that is provided in the home.**

## 2024-05-15 NOTE — PATIENT INSTRUCTIONS
I have recommended that you continue with donepezil and sertraline  No dosage adjustments today    Blood test today to look for markers of Alzheimer's dementia    Please visit with another neurologist who specializes in memory impairment    Keep up with your physical activity, this is the most beneficial thing for muscle tightness related to multiple sclerosis    A new referral to home care was sent so that they can come do with a home safety evaluation    Follow-up with me in 6 months

## 2024-08-18 ENCOUNTER — HEALTH MAINTENANCE LETTER (OUTPATIENT)
Age: 69
End: 2024-08-18

## 2024-10-28 DIAGNOSIS — G35 RELAPSING REMITTING MULTIPLE SCLEROSIS (H): ICD-10-CM

## 2024-10-28 RX ORDER — SERTRALINE HYDROCHLORIDE 100 MG/1
100 TABLET, FILM COATED ORAL DAILY
Qty: 90 TABLET | Refills: 3 | Status: SHIPPED | OUTPATIENT
Start: 2024-10-28

## 2024-10-28 NOTE — TELEPHONE ENCOUNTER
Received refill request for sertraline from [facility]; Patient was last seen in May 2024 and has follow up appointment in Nov 2024 with Dr Humphrey. Refilled per MS refill protocol.    Madelaine Schmidt RN

## 2024-12-28 ENCOUNTER — HEALTH MAINTENANCE LETTER (OUTPATIENT)
Age: 69
End: 2024-12-28

## 2025-03-10 DIAGNOSIS — F03.B0 MODERATE DEMENTIA, UNSPECIFIED DEMENTIA TYPE, UNSPECIFIED WHETHER BEHAVIORAL, PSYCHOTIC, OR MOOD DISTURBANCE OR ANXIETY (H): ICD-10-CM

## 2025-03-10 NOTE — TELEPHONE ENCOUNTER
Received refill request for donepezil from [facility]. Patient was last seen in May 2024 and has no follow up appointment with Dr Humphrey. Refill request pended to Dr Humphrey.    Madelaine Schmidt RN

## 2025-03-11 RX ORDER — DONEPEZIL HYDROCHLORIDE 10 MG/1
10 TABLET, FILM COATED ORAL DAILY
Qty: 30 TABLET | Refills: 11 | Status: SHIPPED | OUTPATIENT
Start: 2025-03-11

## 2025-08-25 ENCOUNTER — LAB REQUISITION (OUTPATIENT)
Dept: LAB | Facility: CLINIC | Age: 70
End: 2025-08-25
Payer: MEDICARE

## 2025-08-25 DIAGNOSIS — E78.2 MIXED HYPERLIPIDEMIA: ICD-10-CM

## 2025-08-25 DIAGNOSIS — E55.9 VITAMIN D DEFICIENCY, UNSPECIFIED: ICD-10-CM

## 2025-08-26 LAB
CHOLEST SERPL-MCNC: 231 MG/DL
FASTING STATUS PATIENT QL REPORTED: ABNORMAL
HDLC SERPL-MCNC: 47 MG/DL
LDLC SERPL CALC-MCNC: 156 MG/DL
NONHDLC SERPL-MCNC: 184 MG/DL
TRIGL SERPL-MCNC: 142 MG/DL
VIT D+METAB SERPL-MCNC: 31 NG/ML (ref 20–50)